# Patient Record
Sex: FEMALE | Race: WHITE | NOT HISPANIC OR LATINO
[De-identification: names, ages, dates, MRNs, and addresses within clinical notes are randomized per-mention and may not be internally consistent; named-entity substitution may affect disease eponyms.]

---

## 2018-01-12 ENCOUNTER — RECORD ABSTRACTING (OUTPATIENT)
Age: 58
End: 2018-01-12

## 2018-01-12 DIAGNOSIS — Z82.49 FAMILY HISTORY OF ISCHEMIC HEART DISEASE AND OTHER DISEASES OF THE CIRCULATORY SYSTEM: ICD-10-CM

## 2018-01-17 ENCOUNTER — APPOINTMENT (OUTPATIENT)
Dept: FAMILY MEDICINE | Facility: CLINIC | Age: 58
End: 2018-01-17
Payer: COMMERCIAL

## 2018-01-17 VITALS — SYSTOLIC BLOOD PRESSURE: 100 MMHG | DIASTOLIC BLOOD PRESSURE: 60 MMHG

## 2018-01-17 VITALS
HEIGHT: 65 IN | BODY MASS INDEX: 29.66 KG/M2 | DIASTOLIC BLOOD PRESSURE: 58 MMHG | TEMPERATURE: 97.8 F | WEIGHT: 178 LBS | HEART RATE: 88 BPM | RESPIRATION RATE: 16 BRPM | OXYGEN SATURATION: 97 % | SYSTOLIC BLOOD PRESSURE: 104 MMHG

## 2018-01-17 PROCEDURE — 86580 TB INTRADERMAL TEST: CPT

## 2018-01-17 PROCEDURE — 99213 OFFICE O/P EST LOW 20 MIN: CPT | Mod: 25

## 2018-01-19 ENCOUNTER — APPOINTMENT (OUTPATIENT)
Dept: FAMILY MEDICINE | Facility: CLINIC | Age: 58
End: 2018-01-19
Payer: COMMERCIAL

## 2018-01-19 PROCEDURE — ZZZZZ: CPT

## 2018-02-14 ENCOUNTER — NON-APPOINTMENT (OUTPATIENT)
Age: 58
End: 2018-02-14

## 2018-02-14 ENCOUNTER — APPOINTMENT (OUTPATIENT)
Dept: FAMILY MEDICINE | Facility: CLINIC | Age: 58
End: 2018-02-14
Payer: COMMERCIAL

## 2018-02-14 VITALS
DIASTOLIC BLOOD PRESSURE: 70 MMHG | RESPIRATION RATE: 14 BRPM | TEMPERATURE: 98 F | SYSTOLIC BLOOD PRESSURE: 128 MMHG | HEIGHT: 65 IN | HEART RATE: 60 BPM | BODY MASS INDEX: 29.99 KG/M2 | WEIGHT: 180 LBS | OXYGEN SATURATION: 98 %

## 2018-02-14 LAB
BILIRUB UR QL STRIP: NORMAL
GLUCOSE UR-MCNC: NORMAL
HCG UR QL: 0.2 EU/DL
HGB UR QL STRIP.AUTO: NORMAL
KETONES UR-MCNC: NORMAL
LEUKOCYTE ESTERASE UR QL STRIP: NORMAL
NITRITE UR QL STRIP: NORMAL
PH UR STRIP: 7
PROT UR STRIP-MCNC: NORMAL
SP GR UR STRIP: 1.02

## 2018-02-14 PROCEDURE — 99396 PREV VISIT EST AGE 40-64: CPT | Mod: 25

## 2018-02-14 PROCEDURE — 81003 URINALYSIS AUTO W/O SCOPE: CPT | Mod: QW

## 2018-02-14 PROCEDURE — 93000 ELECTROCARDIOGRAM COMPLETE: CPT

## 2018-02-14 RX ORDER — TELMISARTAN AND HYDROCHLOROTHIAZIDE 40; 12.5 MG/1; MG/1
40-12.5 TABLET ORAL
Refills: 0 | Status: DISCONTINUED | COMMUNITY
End: 2018-02-14

## 2018-02-16 LAB — BACTERIA UR CULT: NORMAL

## 2018-02-21 ENCOUNTER — APPOINTMENT (OUTPATIENT)
Dept: FAMILY MEDICINE | Facility: CLINIC | Age: 58
End: 2018-02-21
Payer: COMMERCIAL

## 2018-02-21 VITALS
SYSTOLIC BLOOD PRESSURE: 128 MMHG | HEART RATE: 84 BPM | OXYGEN SATURATION: 98 % | BODY MASS INDEX: 29.82 KG/M2 | WEIGHT: 179 LBS | RESPIRATION RATE: 14 BRPM | HEIGHT: 65 IN | DIASTOLIC BLOOD PRESSURE: 78 MMHG

## 2018-02-21 DIAGNOSIS — Z78.0 ASYMPTOMATIC MENOPAUSAL STATE: ICD-10-CM

## 2018-02-21 PROCEDURE — 36415 COLL VENOUS BLD VENIPUNCTURE: CPT

## 2018-02-22 LAB
BASOPHILS # BLD AUTO: 0.01 K/UL
BASOPHILS NFR BLD AUTO: 0.2 %
EOSINOPHIL # BLD AUTO: 0.07 K/UL
EOSINOPHIL NFR BLD AUTO: 1.4 %
HCT VFR BLD CALC: 40.2 %
HGB BLD-MCNC: 13 G/DL
IMM GRANULOCYTES NFR BLD AUTO: 0 %
LYMPHOCYTES # BLD AUTO: 1.52 K/UL
LYMPHOCYTES NFR BLD AUTO: 30.3 %
MAN DIFF?: NORMAL
MCHC RBC-ENTMCNC: 30.7 PG
MCHC RBC-ENTMCNC: 32.3 GM/DL
MCV RBC AUTO: 95 FL
MONOCYTES # BLD AUTO: 0.37 K/UL
MONOCYTES NFR BLD AUTO: 7.4 %
NEUTROPHILS # BLD AUTO: 3.04 K/UL
NEUTROPHILS NFR BLD AUTO: 60.7 %
PLATELET # BLD AUTO: 311 K/UL
RBC # BLD: 4.23 M/UL
RBC # FLD: 14.4 %
WBC # FLD AUTO: 5.01 K/UL

## 2018-02-25 LAB — CYTOLOGY CVX/VAG DOC THIN PREP: NORMAL

## 2018-03-07 ENCOUNTER — MEDICATION RENEWAL (OUTPATIENT)
Age: 58
End: 2018-03-07

## 2018-03-15 ENCOUNTER — MEDICATION RENEWAL (OUTPATIENT)
Age: 58
End: 2018-03-15

## 2018-06-18 ENCOUNTER — APPOINTMENT (OUTPATIENT)
Dept: FAMILY MEDICINE | Facility: CLINIC | Age: 58
End: 2018-06-18
Payer: COMMERCIAL

## 2018-06-18 VITALS
HEART RATE: 70 BPM | DIASTOLIC BLOOD PRESSURE: 72 MMHG | WEIGHT: 176 LBS | SYSTOLIC BLOOD PRESSURE: 118 MMHG | BODY MASS INDEX: 29.29 KG/M2 | OXYGEN SATURATION: 98 %

## 2018-06-18 PROCEDURE — 99213 OFFICE O/P EST LOW 20 MIN: CPT

## 2018-06-18 NOTE — HISTORY OF PRESENT ILLNESS
[FreeTextEntry8] : Pt is here c/o poss pink eye. Pt c/o itchiness, grittiness, redness, and sensitivity to light in both eyes for about 1 day. Pt is not sure if its allergies or pink eye.

## 2018-06-18 NOTE — ASSESSMENT
[FreeTextEntry1] : trial tobradex and optivar eye drops. no eye make use compresses and otc allegra for antihistamine relief\par We spent sufficient time to discuss aspects of care; questions were answered  to patient's satisfaction.The diagnosis and care plan were discussed with patient in detail.  Patient test results were  reviewed and explained in full. All questions were answered.\par

## 2018-06-18 NOTE — REVIEW OF SYSTEMS
[Discharge] : discharge [Vision Problems] : vision problems [Itching] : itching [Negative] : Respiratory

## 2018-06-18 NOTE — PHYSICAL EXAM
[Conjunctival Hyperemia Bilateral] : hyperemia [Conjunctival Watery Discharge Both Eyes] : a watery discharge [Conjunctival Purulent Discharge Both Eyes] : a purulent discharge [Epiphora Both Eyes] : increased tearing [Normal Rhythm/Effort] : normal respiratory rhythm and effort [Clear Bilaterally] : the lungs were clear to auscultation bilaterally [Normal to Percussion] : the lungs were normal to percussion [Normal] : palpation of the chest was normal [Normal Rate] : normal [Normal S1] : normal S1 [Normal S2] : normal S2 [No Murmur] : no murmurs heard [No Pitting Edema] : no pitting edema present [2+] : left 2+ [No Abnormalities] : the abdominal aorta was not enlarged and no bruit was heard [S3] : no S3 [S4] : no S4 [Right Carotid Bruit] : no bruit heard over the right carotid [Left Carotid Bruit] : no bruit heard over the left carotid [Right Femoral Bruit] : no bruit heard over the right femoral artery [Left Femoral Bruit] : no bruit heard over the left femoral artery

## 2019-04-25 ENCOUNTER — APPOINTMENT (OUTPATIENT)
Dept: FAMILY MEDICINE | Facility: CLINIC | Age: 59
End: 2019-04-25
Payer: COMMERCIAL

## 2019-04-25 VITALS
HEIGHT: 65 IN | WEIGHT: 181 LBS | SYSTOLIC BLOOD PRESSURE: 120 MMHG | BODY MASS INDEX: 30.16 KG/M2 | DIASTOLIC BLOOD PRESSURE: 80 MMHG | RESPIRATION RATE: 14 BRPM | OXYGEN SATURATION: 97 % | HEART RATE: 88 BPM

## 2019-04-25 DIAGNOSIS — Z11.1 ENCOUNTER FOR SCREENING FOR RESPIRATORY TUBERCULOSIS: ICD-10-CM

## 2019-04-25 DIAGNOSIS — Z87.898 PERSONAL HISTORY OF OTHER SPECIFIED CONDITIONS: ICD-10-CM

## 2019-04-25 PROCEDURE — 99214 OFFICE O/P EST MOD 30 MIN: CPT

## 2019-04-25 RX ORDER — NYSTATIN 100000 [USP'U]/G
100000 CREAM TOPICAL
Refills: 0 | Status: COMPLETED | COMMUNITY
End: 2019-04-25

## 2019-04-25 RX ORDER — TOBRAMYCIN AND DEXAMETHASONE 3; 1 MG/ML; MG/ML
0.3-0.1 SUSPENSION/ DROPS OPHTHALMIC EVERY 8 HOURS
Qty: 5 | Refills: 0 | Status: COMPLETED | COMMUNITY
Start: 2018-06-18 | End: 2019-04-25

## 2019-04-25 RX ORDER — AZELASTINE HYDROCHLORIDE 0.5 MG/ML
0.05 SOLUTION/ DROPS OPHTHALMIC
Qty: 1 | Refills: 0 | Status: COMPLETED | COMMUNITY
Start: 2018-06-18 | End: 2019-04-25

## 2019-04-25 RX ORDER — ESTRADIOL 0.1 MG/G
0.1 CREAM VAGINAL
Qty: 3 | Refills: 0 | Status: COMPLETED | COMMUNITY
End: 2019-04-25

## 2019-04-25 NOTE — PHYSICAL EXAM
[No Acute Distress] : no acute distress [Well Nourished] : well nourished [Well Developed] : well developed [Well-Appearing] : well-appearing [No Respiratory Distress] : no respiratory distress  [Clear to Auscultation] : lungs were clear to auscultation bilaterally [No Accessory Muscle Use] : no accessory muscle use [Normal Rate] : normal rate  [Regular Rhythm] : with a regular rhythm [Normal S1, S2] : normal S1 and S2 [No Murmur] : no murmur heard [Coordination Grossly Intact] : coordination grossly intact [Normal Gait] : normal gait [No Focal Deficits] : no focal deficits [Normal Affect] : the affect was normal [Alert and Oriented x3] : oriented to person, place, and time [Normal Insight/Judgement] : insight and judgment were intact

## 2019-04-25 NOTE — HISTORY OF PRESENT ILLNESS
[FreeTextEntry1] : Patient presents for med check\par  [de-identified] : Patient presents today for follow up for hypertension. Patient reports feeling well today, no acute complaints. States [place]

## 2019-04-25 NOTE — HEALTH RISK ASSESSMENT
[Patient reported colonoscopy was normal] : Patient reported colonoscopy was normal [ColonoscopyDate] : 01/14 [ColonoscopyComments] : 5 yr follow up as per pt

## 2019-04-25 NOTE — ASSESSMENT
[FreeTextEntry1] : Pt not fasting today, will RTO for blood work appt. \par Vitals and exam stable. \par BP well controlled today, continue current regimen. \par Pt plans to get follow up pap, mammo, and colonoscopy. \par RTO in 6 mos.

## 2019-04-26 ENCOUNTER — APPOINTMENT (OUTPATIENT)
Dept: FAMILY MEDICINE | Facility: CLINIC | Age: 59
End: 2019-04-26
Payer: COMMERCIAL

## 2019-04-26 DIAGNOSIS — Z13.21 ENCOUNTER FOR SCREENING FOR NUTRITIONAL DISORDER: ICD-10-CM

## 2019-04-26 PROCEDURE — 36415 COLL VENOUS BLD VENIPUNCTURE: CPT

## 2019-04-29 LAB
25(OH)D3 SERPL-MCNC: 22.6 NG/ML
ALBUMIN SERPL ELPH-MCNC: 4.6 G/DL
ALP BLD-CCNC: 99 U/L
ALT SERPL-CCNC: 17 U/L
ANION GAP SERPL CALC-SCNC: 10 MMOL/L
AST SERPL-CCNC: 18 U/L
BASOPHILS # BLD AUTO: 0.03 K/UL
BASOPHILS NFR BLD AUTO: 0.9 %
BILIRUB SERPL-MCNC: 0.5 MG/DL
BUN SERPL-MCNC: 14 MG/DL
CALCIUM SERPL-MCNC: 10 MG/DL
CHLORIDE SERPL-SCNC: 104 MMOL/L
CHOLEST SERPL-MCNC: 211 MG/DL
CHOLEST/HDLC SERPL: 3.2 RATIO
CO2 SERPL-SCNC: 27 MMOL/L
CREAT SERPL-MCNC: 0.8 MG/DL
EOSINOPHIL # BLD AUTO: 0.07 K/UL
EOSINOPHIL NFR BLD AUTO: 2.1 %
ESTIMATED AVERAGE GLUCOSE: 126 MG/DL
GLUCOSE SERPL-MCNC: 102 MG/DL
HBA1C MFR BLD HPLC: 6 %
HCT VFR BLD CALC: 41.6 %
HDLC SERPL-MCNC: 66 MG/DL
HGB BLD-MCNC: 13.3 G/DL
IMM GRANULOCYTES NFR BLD AUTO: 0 %
LDLC SERPL CALC-MCNC: 131 MG/DL
LYMPHOCYTES # BLD AUTO: 1.53 K/UL
LYMPHOCYTES NFR BLD AUTO: 45.4 %
MAN DIFF?: NORMAL
MCHC RBC-ENTMCNC: 31.4 PG
MCHC RBC-ENTMCNC: 32 GM/DL
MCV RBC AUTO: 98.1 FL
MONOCYTES # BLD AUTO: 0.31 K/UL
MONOCYTES NFR BLD AUTO: 9.2 %
NEUTROPHILS # BLD AUTO: 1.43 K/UL
NEUTROPHILS NFR BLD AUTO: 42.4 %
PLATELET # BLD AUTO: 296 K/UL
POTASSIUM SERPL-SCNC: 4.8 MMOL/L
PROT SERPL-MCNC: 7 G/DL
RBC # BLD: 4.24 M/UL
RBC # FLD: 13.4 %
SODIUM SERPL-SCNC: 141 MMOL/L
T4 SERPL-MCNC: 6.2 UG/DL
TRIGL SERPL-MCNC: 69 MG/DL
TSH SERPL-ACNC: 2.19 UIU/ML
WBC # FLD AUTO: 3.37 K/UL

## 2021-01-02 ENCOUNTER — TRANSCRIPTION ENCOUNTER (OUTPATIENT)
Age: 61
End: 2021-01-02

## 2021-01-04 ENCOUNTER — APPOINTMENT (OUTPATIENT)
Age: 61
End: 2021-01-04
Payer: MEDICAID

## 2021-01-04 VITALS
HEART RATE: 83 BPM | HEIGHT: 65 IN | BODY MASS INDEX: 30.82 KG/M2 | SYSTOLIC BLOOD PRESSURE: 136 MMHG | WEIGHT: 185 LBS | DIASTOLIC BLOOD PRESSURE: 96 MMHG

## 2021-01-04 DIAGNOSIS — I10 ESSENTIAL (PRIMARY) HYPERTENSION: ICD-10-CM

## 2021-01-04 PROCEDURE — 99203 OFFICE O/P NEW LOW 30 MIN: CPT

## 2021-01-04 PROCEDURE — 99072 ADDL SUPL MATRL&STAF TM PHE: CPT

## 2021-01-04 NOTE — HISTORY OF PRESENT ILLNESS
[Stable] : stable [___ days] : [unfilled] day(s) ago [2] : a current pain level of 2/10 [Lifting] : worsened by lifting [Rest] : relieved by rest [de-identified] : ZEYAD is a 60 year old female who presents today for initial evaluation of right shoulder greater tuberosity fracture 3 days ago.  Patient fell while ice skating and endorsed immediate pain.  She presented to a [facility] where x-rays obtained revealed a mildly displaced greater tuberosity fracture as well as calcific tendinitis.  She presents today without a sling and appears to be in little to no discomfort.  She admits to pain with arm abduction.  Her pain is sharp in nature.\par

## 2021-01-04 NOTE — DISCUSSION/SUMMARY
[de-identified] : Velia is a 60-year-old female sustained a fall to her right shoulder with a right greater tuberosity proximal humerus fracture.  It appears to be nondisplaced.  At this time I recommend sling immobilization.  I will see her back in 1 week for new x-rays.  If the x-rays demonstrate no increased displacement of the greater tuberosity will likely continue nonoperative care.  We may need sequential weekly x-rays.  If there is displacement we will discuss CT scan and possible surgical intervention.  He agrees with the above plan all questions were answered.

## 2021-01-04 NOTE — REASON FOR VISIT
[Initial Visit] : an initial visit for [FreeTextEntry2] : right shoulder pain, greater tuberosity fracture, DOI: 1/1/2021.

## 2021-01-04 NOTE — PHYSICAL EXAM
[de-identified] : Physical Exam:\par General: Well appearing, no acute distress, A&O\par Neurologic: A&Ox3, No focal deficits\par Head: NCAT without abrasions, lacerations, or ecchymosis to head, face, or scalp\par Eyes: No scleral icterus, no gross abnormalities\par Respiratory: Equal chest wall expansion bilaterally, no accessory muscle use\par Lymphatic: No lymphadenopathy palpated\par Skin: Warm and dry\par Psychiatric: Normal mood and affect\par \par Right Shoulder\par ·	Inspection/Palpation: Moderate swelling no ecchymosis right deltoid\par ·	Range of Motion: no crepitus with ROM; Active FF 10; ER at side 5; IR to belt; Passive  with mild pain; ER at side 25 with mild pain; IR to belt\par ·	Strength: forward elevation in scapular plane [4/5], internal rotation [4/5], external rotation [4/5], adduction [4/5] and abduction [4/5]\par ·	Stability: no joint instability on provocative testing\par ·	Tests: Negrete test positive, Neer positive, bear hug test positive, Napolean sign negative, cross arm adduction negative, lift off sign positive, hornblowers sign negative, speeds test negative, Yergason's test negative, no bicipital groove tenderness, Nielsen's Active Compression test negative, whipple test [negative/positive], bicep's load II test negative\par \par Left Shoulder\par ·	Inspection/Palpation: no tenderness, swelling or deformities\par ·	Range of Motion: full and painless in all planes, no crepitus\par ·	Strength: forward elevation in scapular plane 5/5, internal rotation 5/5, external rotation 5/5, adduction 5/5 and abduction 5/5\par ·	Stability: no joint instability on provocative testing\par Tests: Negrete test negative, Neer sign negative, negative drop arm test secondary to pain, bear hug test negative, Napolean sign negative, cross arm adduction negative, lift off sign positive, hornblowers sign negative, speeds test negative, Yergason's test negative, no bicipital groove tenderness, Nielsen's Active Compression test negative [de-identified] : 3 views of the right shoulder performed in outside facility but was able to review them.  They demonstrate a greater tuberosity proximal humerus fracture as well as a large calcium deposit in the infraspinatus

## 2021-01-07 ENCOUNTER — APPOINTMENT (OUTPATIENT)
Dept: FAMILY MEDICINE | Facility: CLINIC | Age: 61
End: 2021-01-07
Payer: MEDICAID

## 2021-01-07 ENCOUNTER — TRANSCRIPTION ENCOUNTER (OUTPATIENT)
Age: 61
End: 2021-01-07

## 2021-01-07 VITALS
HEART RATE: 89 BPM | BODY MASS INDEX: 30.82 KG/M2 | DIASTOLIC BLOOD PRESSURE: 78 MMHG | SYSTOLIC BLOOD PRESSURE: 160 MMHG | OXYGEN SATURATION: 98 % | WEIGHT: 185 LBS | RESPIRATION RATE: 14 BRPM | TEMPERATURE: 97.3 F | HEIGHT: 65 IN

## 2021-01-07 DIAGNOSIS — Z01.419 ENCOUNTER FOR GYNECOLOGICAL EXAMINATION (GENERAL) (ROUTINE) W/OUT ABNORMAL FINDINGS: ICD-10-CM

## 2021-01-07 PROCEDURE — 99072 ADDL SUPL MATRL&STAF TM PHE: CPT

## 2021-01-07 PROCEDURE — 99396 PREV VISIT EST AGE 40-64: CPT | Mod: 25

## 2021-01-07 RX ORDER — TELMISARTAN 20 MG/1
20 TABLET ORAL
Qty: 90 | Refills: 2 | Status: DISCONTINUED | COMMUNITY
Start: 2018-01-17 | End: 2021-01-07

## 2021-01-07 NOTE — HISTORY OF PRESENT ILLNESS
[___ Year(s) Ago] : [unfilled] year(s) ago [Good] : being in good health [Healthy Diet] : a healthy diet [Regular Exercise] : regular exercise [Last Mammogram ___] : Last Mammogram was [unfilled] [Last Bone Density ___] : Last bone density studies [unfilled] [Last Colonoscopy ___] : Last colonoscopy [unfilled] [Last Pap ___] : Last cervical pap smear was [unfilled] [Last Pap Smear ___] : last Papanicolaou cytology done [unfilled] [Annual Vaginal Sonography ___] : annual vaginal sonograph [unfilled] [BRCA1 ___] : BRCA1 gene [unfilled] [ ___] :  [unfilled] [Mammogram Last Year] : a mammogram last year [Colonoscopy Within 10 Years] : a colonoscopy within the past ten years [Performed Last Year] : glucose screening performed last year [Performed Within 5 Years] : DEXA performed within the past five years [Hypertension] : hypertension [Postmenopausal] : is postmenopausal [Pregnancy History] : pregnancy history: [Total Preg ___] : [unfilled] [Full Term ___] : [unfilled] [Living ___] : [unfilled] [Up to Date] : up to date with ~his/her~ immunizations [Lesion] : lesion [0/10] : is 0/10 in severity [Skin Color Change] : skin color change [V/V Cream] : improved by V/V cream [Currently In Menopause] : currently in menopause [Menopause Age: ____] : age at menopause was [unfilled] [Age of First Sexual [place] ___] : became sexually active at the age of [unfilled] [Male ___] : [unfilled] male [Vaginal Itching] : vaginal itching [Weight Concerns] : no concerns with her weight [Diabetes] : no diabetes [Stress] : no stress [Obesity] : no obesity [Tobacco Use] : no tobacco use [Illicit Drug Use] : no illicit drug use [Sedentary Lifestyle] : no sedentary lifestyle [FH Cardiovascular Disease] : no family history of cardiovascular disease [Previous Breast Cancer] : no previous breast cancer [Abnormal Cervical Cytology] : no abnormal cervical cytology [HPV Positive] : no positive screening for human papilloma virus [Hormone Therapy] : no hormone therapy [In Utero CLAUDINE Exposure] : no diethylstilbestrol (CLAUDINE) exposure in utero [Previous Colon Polyps] : no previous colon polyps [Inflammatory Bowel Disease] : no inflammatory bowel disease [Osteoporosis Risk Factors] : no osteoporosis risk factors [Asbestos Exposure] : no asbestos exposure [Radiation Exposure] : no radiation exposure [Occupational Exp. to ___] : no occupational exposure to [unfilled] [Vaginal Odor Foul] : vaginal odor not foul [Genital Wart] : no genital wart [Skin Growth] : no skin growth(s) [Skin Ulcerations] : no skin ulceration(s) [Cold Compress] : not improved with cold compress [Warm Compress] : not improved by warm compress [Oral Medications] : not improved by oral medications [Urination] : not worsened by urinating [Heat] : not worsened by heat [Sweat] : not worsened by sweating [Diabetes Mellitus] : no diabetes mellitus [Crohn's Disease] : no Crohn's disease [Autoimmune Disease] : no autoimmune disease [Thyrotoxicosis] : no thyrotoxicosis with or without goiter [Hx HSV] : no herpes simplex [HX HPV] : no human papilloma virus infection [FreeTextEntry9] : no [Hot Flashes] : no hot flashes [Night Sweats] : no night sweats [Dyspareunia] : no dyspareunia [Mood Changes] : no mood changes [Headache] : no headache [Fatigue] : no fatigue [Weight Change] : no weight change [Irritability] : no irritability [Forgetfulness] : no forgetfulness [Loss of Libido] : no loss of libido [Depression] : no depression [Anxiety] : no anxiety [FreeTextEntry8] : no [Experiencing Menopausal Sxs] : not experiencing menopausal symptoms [Sexually Active] : is not sexually active [Contraception] : does not use contraception

## 2021-01-07 NOTE — DISCUSSION/SUMMARY
[FreeTextEntry1] : Basic cardiovascular prevention measures are advised including regular exercise, surveillance medical examination, and prudent portion-controlled low fat diet, rich in a variety of vegetables with minimal added sugars, refined starches, and no artificially hydrogenated oils.\par Discussion and interpretation of previous tests , external notes( labs, radiology, specialist  , patient verbalized understanding.\par Labs drawn/ specimens obtained  in office on this date of service  for evaluation of   assessed conditions pap   to be run at [facility].\par mammogram\par breast us\par pelvic sono\par refer to gyn for vulvar evaluation biopsy treatment\par

## 2021-01-07 NOTE — END OF VISIT
[>50% of Time Spent on Counseling and Coordination of Care for  ___] : Greater than 50% of the encounter time was spent on counseling and coordination of care for [unfilled] [Time Spent: ___ minutes] : I have spent [unfilled] minutes of face to face time with the patient [>50% of the face to face encounter time was spent on counseling and/or coordination of care for ___] : Greater than 50% of the face to face encounter time was spent on counseling and/or coordination of care for [unfilled]

## 2021-01-07 NOTE — CHIEF COMPLAINT
[Annual Visit] : annual visit [FreeTextEntry1] : pt moved [facility]  to [place] with my mom\par [place]  [place] and kayak and the dog is with somewhere else.\par We moved back 2 months ago bc the opportunities were not there for Shaun and employment. shahnaz w Covid.  My mom is 92 is well and [place]\par I got a kidney stone in May 2019\par I became a grandmother. I went to [place] and saw my son who got  and also had a grandchild.\par I was followed by a specialist in [place] after the kidney stone.\par My bp was high. 130/80\par At home 113/70  but sometimes 145/   It goes up and down. I am on telmisartan 40 mg. \par I have had yeast infections. I do have a spot that is itchy and not getting. ...it is raised. \par \par i also fracture my right shoulder  on [place] ice skating

## 2021-01-07 NOTE — COUNSELING
[Breast Self Exam] : breast self exam [Nutrition] : nutrition [Exercise] : exercise [Vitamins/Supplements] : vitamins/supplements [Sunscreen] : sunscreen [Vulvar Hygiene] : vulvar hygiene

## 2021-01-07 NOTE — PHYSICAL EXAM
[Awake] : awake [Alert] : alert [Acute Distress] : acute distress [Vulvar Atrophy] : vulvar atrophy [Vulvar Mass ___ cm] : a [unfilled] ~Ucm vulvar mass [Labia Majora Erythema Right] : erythema of the right labia majora [Labia Majora Erythema Left] : erythema of the left labia majora [Normal] : uterus [de-identified] : right vulvar raised irregular lesion  [de-identified] : right vulvar raised irregular lesion

## 2021-01-11 ENCOUNTER — APPOINTMENT (OUTPATIENT)
Dept: ORTHOPEDIC SURGERY | Facility: CLINIC | Age: 61
End: 2021-01-11
Payer: MEDICAID

## 2021-01-11 PROCEDURE — 99214 OFFICE O/P EST MOD 30 MIN: CPT

## 2021-01-11 PROCEDURE — 99072 ADDL SUPL MATRL&STAF TM PHE: CPT

## 2021-01-11 PROCEDURE — 73030 X-RAY EXAM OF SHOULDER: CPT | Mod: RT

## 2021-01-12 ENCOUNTER — APPOINTMENT (OUTPATIENT)
Dept: OBGYN | Facility: CLINIC | Age: 61
End: 2021-01-12
Payer: MEDICAID

## 2021-01-12 VITALS
DIASTOLIC BLOOD PRESSURE: 80 MMHG | TEMPERATURE: 97.8 F | SYSTOLIC BLOOD PRESSURE: 140 MMHG | WEIGHT: 190 LBS | HEIGHT: 65 IN | BODY MASS INDEX: 31.65 KG/M2

## 2021-01-12 DIAGNOSIS — Z83.518 FAMILY HISTORY OF OTHER SPECIFIED EYE DISORDER: ICD-10-CM

## 2021-01-12 PROCEDURE — 99203 OFFICE O/P NEW LOW 30 MIN: CPT | Mod: 25

## 2021-01-12 PROCEDURE — 99072 ADDL SUPL MATRL&STAF TM PHE: CPT

## 2021-01-12 PROCEDURE — 56605 BIOPSY OF VULVA/PERINEUM: CPT

## 2021-01-12 NOTE — PROCEDURE
[Vulvar Biopsy] : Vulvar Biopsy [Time out performed] : Pre-procedure time out performed.  Patient's name, date of birth and procedure confirmed. [Consent Obtained] : Consent obtained [] : on the right labia majora [Local Anesthesia] : local anesthesia [____ Lidocaine w/o Epi] : ~VmL lidocaine without epinephrine [Betadine] : Betadine [Sent to Pathology] : placed in buffered formalin and sent for pathology [Punch] : punch biopsy [Silver Nitrate] : silver nitrate [Pressure] : the application of direct pressure [Tolerated Well] : the patient tolerated the procedure well [No Complications] : there were no complications [de-identified] : right vulva/labia 10oclock [de-identified] : 5mm

## 2021-01-12 NOTE — LETTER GREETING
[FreeTextEntry1] : Dear Alcira Villeda, \par I had the pleasure of evaluating your patient, ZEYAD DICKINSON. Please see my summary of recommendations followed by my full consultation note. \par \par Thank you for allowing me to participate in the care of this patient. If you have any questions, please do not hesitate to contact me.\par \par Sincerely,\par \par Liv Jimenez MD, FACOG \par [facility]\par Obstetrics and Gynecology in [place]\par [address] [place] [address], [address]\[place] [address]\[facility] Phone: [phone] Fax: [phone]

## 2021-01-12 NOTE — PHYSICAL EXAM
[Alert] : alert [Appropriately responsive] : appropriately responsive [No Acute Distress] : no acute distress [No Lymphadenopathy] : no lymphadenopathy [Soft] : soft [Non-tender] : non-tender [Non-distended] : non-distended [No HSM] : No HSM [No Mass] : no mass [No Lesions] : no lesions [Oriented x3] : oriented x3 [Vulvar Atrophy] : vulvar atrophy [Labia Majora] : normal [Labia Minora] : normal [Normal] : normal [Tenderness] : nontender [Enlarged ___ wks] : not enlarged [Uterine Adnexae] : normal [FreeTextEntry1] : labial agglutination noted anteriorly approximately 1cm, hypopigmentation noted posterior vulva/perineum

## 2021-01-12 NOTE — HISTORY OF PRESENT ILLNESS
[Patient reported mammogram was normal] : Patient reported mammogram was normal [Patient reported colonoscopy was normal] : Patient reported colonoscopy was normal [postmenopausal] : postmenopausal [N] : Patient is not sexually active [Y] : Positive pregnancy history [Mammogramdate] : 2019 [TextBox_19] : pt has referral for appt [PapSmeardate] : 1/7/21 [TextBox_31] : pending [ColonoscopyDate] : 2019 [PGHxTotal] : 3 [[facility]] : 3 [[facility]] : 3 [FreeTextEntry1] : NSVDx3. Denies cysts, fibroids, abnormal pap, STI

## 2021-01-12 NOTE — PLAN
[FreeTextEntry1] : 61 yo with new vulvar lesion, now s/p vulvar biopsy. \par -Vulvar biopsy to pathology\par -Biopsy care reviewed\par -Return in 2 weeks for follow-up\par -Pap results pending

## 2021-01-13 LAB — CYTOLOGY CVX/VAG DOC THIN PREP: ABNORMAL

## 2021-01-13 NOTE — HISTORY OF PRESENT ILLNESS
[Stable] : stable [___ days] : [unfilled] day(s) ago [2] : a current pain level of 2/10 [Lifting] : worsened by lifting [Rest] : relieved by rest [de-identified] : ZEYAD is a 60 year old female who presents today for F/U evaluation of right shoulder greater tuberosity fracture 1/1/2021.  Patient fell while ice skating and endorsed immediate pain.  She presented to a [facility] where x-rays obtained revealed a mildly displaced greater tuberosity fracture as well as calcific tendinitis.  She presents today with a sling and appears to be in little to no discomfort.  She admits to pain with arm abduction.  Her pain is sharp in nature.\par

## 2021-01-13 NOTE — REASON FOR VISIT
[Follow-Up Visit] : a follow-up visit for [FreeTextEntry2] : right shoulder pain, greater tuberosity fx.; DOI: 1/1/2021.

## 2021-01-13 NOTE — PHYSICAL EXAM
[de-identified] : Physical Exam:\par General: Well appearing, no acute distress, A&O\par Neurologic: A&Ox3, No focal deficits\par Head: NCAT without abrasions, lacerations, or ecchymosis to head, face, or scalp\par Eyes: No scleral icterus, no gross abnormalities\par Respiratory: Equal chest wall expansion bilaterally, no accessory muscle use\par Lymphatic: No lymphadenopathy palpated\par Skin: Warm and dry\par Psychiatric: Normal mood and affect\par \par Right Shoulder\par ·	Inspection/Palpation: Moderate swelling no ecchymosis right deltoid\par ·	Range of Motion: no crepitus with ROM; Active FF 10; ER at side 5; IR to belt; Passive  with mild pain; ER at side 25 with mild pain; IR to belt\par ·	Strength: forward elevation in scapular plane [4/5], internal rotation [4/5], external rotation [4/5], adduction [4/5] and abduction [4/5]\par ·	Stability: no joint instability on provocative testing\par ·	Tests: Negrete test positive, Neer positive, bear hug test positive, Napolean sign negative, cross arm adduction negative, lift off sign positive, hornblowers sign negative, speeds test negative, Yergason's test negative, no bicipital groove tenderness, Nielsen's Active Compression test negative, whipple test [negative/positive], bicep's load II test negative\par \par Left Shoulder\par ·	Inspection/Palpation: no tenderness, swelling or deformities\par ·	Range of Motion: full and painless in all planes, no crepitus\par ·	Strength: forward elevation in scapular plane 5/5, internal rotation 5/5, external rotation 5/5, adduction 5/5 and abduction 5/5\par ·	Stability: no joint instability on provocative testing\par Tests: Negrete test negative, Neer sign negative, negative drop arm test secondary to pain, bear hug test negative, Napolean sign negative, cross arm adduction negative, lift off sign positive, hornblowers sign negative, speeds test negative, Yergason's test negative, no bicipital groove tenderness, Nielsen's Active Compression test negative [de-identified] : 2 views of the right shoulder performed today were reviewed with the patient.  They demonstrate a greater tuberosity proximal humerus fracture as well as a large calcium deposit in the infraspinatus

## 2021-01-13 NOTE — DISCUSSION/SUMMARY
[de-identified] : Velia is a 60-year-old female sustained a fall to her right shoulder with a right greater tuberosity proximal humerus fracture.  It appears to be nondisplaced.  At this time I recommend continuation with sling immobilization. Her xrays today demonstrate no increased displacement of the greater tuberosity fracture. Due to this she will likely do well with nonoperative care.  I will see her back in 1 week for new x-rays. If at any point there is displacement further I will recommend a CT scan and surgical intervention. The importance of continuation within her sling and nonweightbearing to this arm was stressed again today for proper healing. She agrees with the above plan all questions were answered.

## 2021-01-22 LAB — CORE LAB BIOPSY: NORMAL

## 2021-01-25 ENCOUNTER — NON-APPOINTMENT (OUTPATIENT)
Age: 61
End: 2021-01-25

## 2021-01-25 ENCOUNTER — APPOINTMENT (OUTPATIENT)
Dept: FAMILY MEDICINE | Facility: CLINIC | Age: 61
End: 2021-01-25
Payer: MEDICAID

## 2021-01-25 VITALS
TEMPERATURE: 97.7 F | SYSTOLIC BLOOD PRESSURE: 150 MMHG | HEART RATE: 67 BPM | WEIGHT: 185 LBS | BODY MASS INDEX: 30.82 KG/M2 | OXYGEN SATURATION: 97 % | RESPIRATION RATE: 14 BRPM | HEIGHT: 65 IN | DIASTOLIC BLOOD PRESSURE: 90 MMHG

## 2021-01-25 PROCEDURE — 99072 ADDL SUPL MATRL&STAF TM PHE: CPT

## 2021-01-25 PROCEDURE — 93000 ELECTROCARDIOGRAM COMPLETE: CPT

## 2021-01-25 PROCEDURE — 99213 OFFICE O/P EST LOW 20 MIN: CPT | Mod: 25

## 2021-01-25 NOTE — ASSESSMENT
[FreeTextEntry1] : change telmisartan add hctz \par check bp q am\par obtain results from last physical chem lipid...\par Basic cardiovascular prevention measures are advised including regular exercise, surveillance medical examination, and prudent portion-controlled low fat diet, rich in a variety of vegetables with minimal added sugars, refined starches, and no artificially hydrogenated oils.\par Discussion and interpretation of previous tests , external notes( labs, radiology, specialist  , patient verbalized understanding.\par Prescription drug management- telmisartan hctz follow up 3 m\par EKG performed on this day in the office and reviewed by THERESE Santiago. It is stable.\par We spent sufficient time to discuss aspects of care; questions were answered  to patient's satisfaction.The diagnosis and care plan were discussed with patient in detail.  Patient test results were  reviewed and explained in full. All questions and concerns  were answered to the best of my knowledge.\par

## 2021-01-25 NOTE — HISTORY OF PRESENT ILLNESS
[FreeTextEntry1] : patient presents for blood pressure check\par  [de-identified] : 59 yo wf here for follow up htn\par Her vaginal biopsy was verruca . she was very relieved\par her shoulder is getting better she is not wearing her brace today\par Her bp is higher than normal\par Otherwise patient reports feeling well.  Patient specifically denies chest pain, shortness of breath, dyspnea on exertion, edema, PND, orthopnea, dizziness, or syncope. Patient reports compliance with medications. Patient denies fever, chills, night sweats, nausea, vomiting , no pain, erythema, swollen joints, hematuria, hematochezia , hematemesis, or melena.\par \par 135/85 at home \par

## 2021-01-27 ENCOUNTER — APPOINTMENT (OUTPATIENT)
Dept: OBGYN | Facility: CLINIC | Age: 61
End: 2021-01-27
Payer: MEDICAID

## 2021-01-27 VITALS
BODY MASS INDEX: 30.86 KG/M2 | TEMPERATURE: 97.6 F | HEIGHT: 66 IN | SYSTOLIC BLOOD PRESSURE: 148 MMHG | WEIGHT: 192 LBS | DIASTOLIC BLOOD PRESSURE: 72 MMHG

## 2021-01-27 PROCEDURE — 99213 OFFICE O/P EST LOW 20 MIN: CPT

## 2021-01-27 PROCEDURE — 99072 ADDL SUPL MATRL&STAF TM PHE: CPT

## 2021-01-27 NOTE — DISCUSSION/SUMMARY
[FreeTextEntry1] : 59 yo with lichen sclerosus and solitary right vulvar lesion increasing in size, biopsy benign. \par -Given concern for underlying malignancy despite negative pathology, I recommend evaluation by GYN Oncology. Referral information given\par -We discussed recommendation for treatment of lichen sclerosus with topical corticosteroids, to be further discussed after Oncology visit\par -All questions were answered to her satisfaction

## 2021-01-27 NOTE — HISTORY OF PRESENT ILLNESS
[FreeTextEntry1] : 61 yo here for results of vulvar biopsy. States had some minimal bleeding the evening of the procedure which resolved. c/o itching around the lesion. \par \par Right vulvar biopsy: verrucous squamous lesion with hyalinization of papillary dermis, compatible with wart associated with hypertrophic lichen sclerosus. \par \par I spoke to the pathologist who states there was no evidence of carcinoma. \par \par Findings reviewed with pt. In regards to lichen sclerosus, denies any hx of this diagnosis in the past. States hx of frequent yeast infections and vaginal itching years ago that resolved 3-4 yrs ago. No further itching until more recently with this lesion occurred.

## 2021-01-27 NOTE — PHYSICAL EXAM
[Appropriately responsive] : appropriately responsive [Alert] : alert [No Acute Distress] : no acute distress [Oriented x3] : oriented x3 [Vulvar Atrophy] : vulvar atrophy [Labia Majora] : normal [Labia Minora] : normal [Normal] : normal [Uterine Adnexae] : normal [Tenderness] : nontender [Enlarged ___ wks] : not enlarged [FreeTextEntry1] : labial agglutination noted anteriorly approximately 1cm, hypopigmentation noted posterior vulva/perineum

## 2021-01-28 ENCOUNTER — APPOINTMENT (OUTPATIENT)
Dept: ORTHOPEDIC SURGERY | Facility: CLINIC | Age: 61
End: 2021-01-28
Payer: MEDICAID

## 2021-01-28 PROCEDURE — 99214 OFFICE O/P EST MOD 30 MIN: CPT

## 2021-01-28 PROCEDURE — 99072 ADDL SUPL MATRL&STAF TM PHE: CPT

## 2021-01-28 PROCEDURE — 73030 X-RAY EXAM OF SHOULDER: CPT | Mod: RT

## 2021-01-28 NOTE — PHYSICAL EXAM
[de-identified] : Physical Exam:\par General: Well appearing, no acute distress, A&O\par Neurologic: A&Ox3, No focal deficits\par Head: NCAT without abrasions, lacerations, or ecchymosis to head, face, or scalp\par Eyes: No scleral icterus, no gross abnormalities\par Respiratory: Equal chest wall expansion bilaterally, no accessory muscle use\par Lymphatic: No lymphadenopathy palpated\par Skin: Warm and dry\par Psychiatric: Normal mood and affect\par \par Right Shoulder\par ·	Inspection/Palpation: Moderate swelling no ecchymosis right deltoid\par ·	Range of Motion: no crepitus with ROM; Active FF 10; ER at side 5; IR to belt; Passive  with mild pain; ER at side 25 with mild pain; IR to belt\par ·	Strength: forward elevation in scapular plane [4/5], internal rotation [4/5], external rotation [4/5], adduction [4/5] and abduction [4/5]\par ·	Stability: no joint instability on provocative testing\par ·	Tests: Negrete test positive, Neer positive, bear hug test positive, Napolean sign negative, cross arm adduction negative, lift off sign positive, hornblowers sign negative, speeds test negative, Yergason's test negative, no bicipital groove tenderness, Nielsen's Active Compression test negative, whipple test [negative/positive], bicep's load II test negative\par \par Left Shoulder\par ·	Inspection/Palpation: no tenderness, swelling or deformities\par ·	Range of Motion: full and painless in all planes, no crepitus\par ·	Strength: forward elevation in scapular plane 5/5, internal rotation 5/5, external rotation 5/5, adduction 5/5 and abduction 5/5\par ·	Stability: no joint instability on provocative testing\par Tests: Negrete test negative, Neer sign negative, negative drop arm test secondary to pain, bear hug test negative, Napolean sign negative, cross arm adduction negative, lift off sign positive, hornblowers sign negative, speeds test negative, Yergason's test negative, no bicipital groove tenderness, Nielsen's Active Compression test negative [de-identified] : 4 views of the right shoulder performed today were reviewed with the patient.  They demonstrate a greater tuberosity proximal humerus fracture with healing noted as well as a large calcium deposit in the infraspinatus.

## 2021-01-28 NOTE — HISTORY OF PRESENT ILLNESS
[Stable] : stable [___ days] : [unfilled] day(s) ago [2] : a current pain level of 2/10 [Lifting] : worsened by lifting [Rest] : relieved by rest [de-identified] : ZEYAD is a 60 year old female who presents today for F/U evaluation of right shoulder greater tuberosity fracture 1/1/2021.  Patient fell while ice skating and endorsed immediate pain.  She presented to a [facility] where x-rays obtained revealed a mildly displaced greater tuberosity fracture as well as calcific tendinitis.  She presents today with a sling and appears to be in little to no discomfort. She reports improved pain and ROM. she denies use of OTC pain medications and numbness and tingling to the extremities.

## 2021-01-28 NOTE — DISCUSSION/SUMMARY
[de-identified] : Velia is a 60-year-old female sustained a fall to her right shoulder with a right greater tuberosity proximal humerus fracture.  It appears to be nondisplaced.  At this time I recommend continuation with sling immobilization. Her xrays today demonstrate no increased displacement of the greater tuberosity fracture with healing noted. Due to this she will likely do well with nonoperative care.  I will see her back in 1 week for new x-rays. If at any point there is displacement further I will recommend a CT scan and surgical intervention. The importance of continuation within her sling and nonweightbearing to this arm was stressed again today for proper healing. She agrees with the above plan all questions were answered.

## 2021-01-28 NOTE — REASON FOR VISIT
[Follow-Up Visit] : a follow-up visit for [FreeTextEntry2] : R shoulder pain, greater tuberosity f/x.

## 2021-02-01 PROBLEM — Z78.9 DENIES ALCOHOL CONSUMPTION: Status: ACTIVE | Noted: 2021-01-12

## 2021-02-01 PROBLEM — Z80.41 FAMILY HISTORY OF MALIGNANT NEOPLASM OF OVARY: Status: ACTIVE | Noted: 2018-01-17

## 2021-02-01 PROBLEM — Z78.9 SOCIAL ALCOHOL USE: Status: ACTIVE | Noted: 2021-02-01

## 2021-02-01 RX ORDER — TELMISARTAN 40 MG/1
40 TABLET ORAL
Refills: 0 | Status: DISCONTINUED | COMMUNITY
End: 2021-02-01

## 2021-02-03 ENCOUNTER — APPOINTMENT (OUTPATIENT)
Dept: GYNECOLOGIC ONCOLOGY | Facility: CLINIC | Age: 61
End: 2021-02-03
Payer: MEDICAID

## 2021-02-03 DIAGNOSIS — Z80.41 FAMILY HISTORY OF MALIGNANT NEOPLASM OF OVARY: ICD-10-CM

## 2021-02-03 DIAGNOSIS — Z78.9 OTHER SPECIFIED HEALTH STATUS: ICD-10-CM

## 2021-02-03 PROCEDURE — 99072 ADDL SUPL MATRL&STAF TM PHE: CPT

## 2021-02-03 PROCEDURE — 56821 COLPOSCOPY VULVA W/BIOPSY: CPT | Mod: 59

## 2021-02-03 PROCEDURE — 99204 OFFICE O/P NEW MOD 45 MIN: CPT | Mod: 25

## 2021-02-03 NOTE — PHYSICAL EXAM
[Abnormal] : Examination of vagina: Abnormal [Absent] : Adnexa(ae): Absent [Normal] : Bimanual Exam: Normal [de-identified] : Fusion of labia minora c/w LS. raised 1 cm lesion at 10 oclock on right labia minora,concerning for invasion. erythema of b/l vaginal introitus.  [de-identified] : Patient was interviewed and examined with chaperone present. Name of Chaperone: Kenna Nascimento MD

## 2021-02-03 NOTE — CHIEF COMPLAINT
[FreeTextEntry1] : [facility]\par \par [facility] [phone] at [address] [place], [place] [id number]

## 2021-02-03 NOTE — HISTORY OF PRESENT ILLNESS
[FreeTextEntry1] : 61 y/o  via  female, LMP 12 years ago being referred by Dr. Jimenez for abnormal vulvar biopsy. She reports to a history of a wart like lesion since 2020, with associated vulvar itching. Patient was seen by primary GYN and underwent right vulvar biopsy which revealed verrucous squamous lesion with hyalinization of papillary dermis, compatible with wart associated with hypertrophic lichen sclerous. Upon examination after biopsy it was noted that the lesion was described as papillary-appearing solitary raised white lesion on right vulva on labia majora, nontender and appears larger than last visit, measuring 2x1cm. Denies vaginal bleeding/discharge, abdominal pain/bloating/distension, pelvis discomfort, changes in normal bowel/urinary habits, nausea/vomiting, unintentional weight loss/gain, and all other associated signs and symptoms. She is not currently sexually active.\par \par Health Maintenance:\par LPap: 2021; normal\par LMammogram: 2019; normal\par LColonoscopy: 2019; normal

## 2021-02-03 NOTE — PROCEDURE
[Vulvar Biopsy] : a vulvar biopsy [Other: ___] : [unfilled] [None] : none [Other ___] :  [unfilled] [Silver Nitrate] : silver nitrate [Colposcopy] : colposcopy [Patient] : the patient [Verbal Consent] : verbal consent was obtained prior to the procedure and is detailed in the patient's record [No Abnormalities] : no abnormalities seen [Lesion] : a lesion was seen [Biopsy] : a biopsy was taken of the lesion [FreeTextEntry9] : Vulvar Lesion [FreeTextEntry1] : A US was performed in the office today. Please see report for findings.

## 2021-02-03 NOTE — END OF VISIT
[FreeTextEntry3] : This note accurately reflects the work and decisions made by me.\par Written by Erin Negron PA-C acting as a scribe for Dr. Susy Zambrano.

## 2021-02-09 ENCOUNTER — APPOINTMENT (OUTPATIENT)
Dept: GYNECOLOGIC ONCOLOGY | Facility: CLINIC | Age: 61
End: 2021-02-09

## 2021-02-16 ENCOUNTER — APPOINTMENT (OUTPATIENT)
Dept: GYNECOLOGIC ONCOLOGY | Facility: CLINIC | Age: 61
End: 2021-02-16
Payer: MEDICAID

## 2021-02-16 VITALS
BODY MASS INDEX: 30.86 KG/M2 | HEIGHT: 66 IN | WEIGHT: 192 LBS | DIASTOLIC BLOOD PRESSURE: 87 MMHG | SYSTOLIC BLOOD PRESSURE: 167 MMHG | RESPIRATION RATE: 16 BRPM | HEART RATE: 80 BPM

## 2021-02-16 PROCEDURE — 99072 ADDL SUPL MATRL&STAF TM PHE: CPT

## 2021-02-16 PROCEDURE — 99214 OFFICE O/P EST MOD 30 MIN: CPT

## 2021-02-16 NOTE — HISTORY OF PRESENT ILLNESS
[FreeTextEntry1] : Pt is a 61 yo with a 1 cm vulvar lesion biopsied in office. She also has lichen sclerosis. She presents for follow up. She has no new concerns.

## 2021-02-16 NOTE — END OF VISIT
[FreeTextEntry3] : Discharge for care with Dr. Mackay for Condyloma acuminatum \par  [Time Spent: ___ minutes] : I have spent [unfilled] minutes of time on the encounter.

## 2021-02-16 NOTE — ASSESSMENT
[FreeTextEntry1] : Pt is a 59 yo with condyloma acuminatum on right vulva ~ 1 cm in size and lichen sclerosis. Discussed there is no evidence of atypia. I will send her back to Dr. Mackay for wide local excision. I would recommend continued surveillance for cervical dysplasia/vulvar dysplasia and use of clobetasol for lichen sclerosus. All questions answered.

## 2021-02-22 LAB — CORE LAB BIOPSY: NORMAL

## 2021-03-03 ENCOUNTER — APPOINTMENT (OUTPATIENT)
Dept: FAMILY MEDICINE | Facility: CLINIC | Age: 61
End: 2021-03-03
Payer: MEDICAID

## 2021-03-03 ENCOUNTER — APPOINTMENT (OUTPATIENT)
Dept: OBGYN | Facility: CLINIC | Age: 61
End: 2021-03-03
Payer: MEDICAID

## 2021-03-03 VITALS
RESPIRATION RATE: 16 BRPM | SYSTOLIC BLOOD PRESSURE: 130 MMHG | HEART RATE: 96 BPM | HEIGHT: 66 IN | OXYGEN SATURATION: 98 % | TEMPERATURE: 97.6 F | DIASTOLIC BLOOD PRESSURE: 68 MMHG | BODY MASS INDEX: 30.53 KG/M2 | WEIGHT: 190 LBS

## 2021-03-03 VITALS
WEIGHT: 189.25 LBS | BODY MASS INDEX: 31.53 KG/M2 | DIASTOLIC BLOOD PRESSURE: 74 MMHG | HEIGHT: 65 IN | SYSTOLIC BLOOD PRESSURE: 138 MMHG | TEMPERATURE: 98.2 F

## 2021-03-03 DIAGNOSIS — A63.0 ANOGENITAL (VENEREAL) WARTS: ICD-10-CM

## 2021-03-03 PROCEDURE — 99072 ADDL SUPL MATRL&STAF TM PHE: CPT

## 2021-03-03 PROCEDURE — 99214 OFFICE O/P EST MOD 30 MIN: CPT

## 2021-03-03 NOTE — PLAN
[FreeTextEntry1] : 61 yo with large vulvar lesion consistent with condyloma acuminatum scheduled for wide local excision.\par -Surgery 3/8\par -Pain management discussed\par -All questions answered to her satisfaction

## 2021-03-03 NOTE — RESULTS/DATA
[ECG Reviewed] : reviewed [Normal] : The 12 - lead ECG is normal [NSR] : normal sinus rhythm [Ventricular Rate___] : ventricular rate is [unfilled] beats per minute [P Waves Normal] : the P wave is normal [Incomplete RBBB] : incomplete right bundle branch block [Normal ST Segments] : the ST segments are normal [] : results reviewed [de-identified] : known hx of right arm fracture

## 2021-03-03 NOTE — PHYSICAL EXAM
[Normal] : normal rate, regular rhythm, normal S1 and S2 and no murmur heard [de-identified] : limited rom right shoulder

## 2021-03-03 NOTE — HISTORY OF PRESENT ILLNESS
[No Pertinent Cardiac History] : no history of aortic stenosis, atrial fibrillation, coronary artery disease, recent myocardial infarction, or implantable device/pacemaker [No Pertinent Pulmonary History] : no history of asthma, COPD, sleep apnea, or smoking [No Adverse Anesthesia Reaction] : no adverse anesthesia reaction in self or family member [(Patient denies any chest pain, claudication, dyspnea on exertion, orthopnea, palpitations or syncope)] : Patient denies any chest pain, claudication, dyspnea on exertion, orthopnea, palpitations or syncope [Chronic Anticoagulation] : no chronic anticoagulation [Chronic Kidney Disease] : no chronic kidney disease [Diabetes] : no diabetes [FreeTextEntry1] : removal of vulvar lesion  [FreeTextEntry2] : 3/8/21 [FreeTextEntry3] : Dr.Melissa Chopra [FreeTextEntry4] : Patient presents for medical clearance. vulvar lesion removal

## 2021-03-03 NOTE — ASSESSMENT
[High Risk Surgery - Intraperitoneal, Intrathoracic or Supringuinal Vascular Procedures] : High Risk Surgery - Intraperitoneal, Intrathoracic or Supringuinal Vascular Procedures - No (0) [Ischemic Heart Disease] : Ischemic Heart Disease - No (0) [Congestive Heart Failure] : Congestive Heart Failure - No (0) [Prior Cerebrovascular Accident or TIA] : Prior Cerebrovascular Accident or TIA - No (0) [Creatinine >= 2mg/dL (1 Point)] : Creatinine >= 2mg/dL - No (0) [Insulin-dependent Diabetic (1 Point)] : Insulin-dependent Diabetic - No (0) [0] : 0 , RCRI Class: I, Risk of Post-Op Cardiac Complications: 3.9%, 95% CI for Risk Estimate: 2.8% - 5.4% [Patient Optimized for Surgery] : Patient optimized for surgery [No Further Testing Recommended] : no further testing recommended [Continue medications as is] : Continue current medications [As per surgery] : as per surgery [FreeTextEntry4] : patient cleared

## 2021-03-03 NOTE — CONSULT LETTER
[Dear  ___] : Dear  [unfilled], [Consult Closing:] : Thank you very much for allowing me to participate in the care of this patient.  If you have any questions, please do not hesitate to contact me. [FreeTextEntry1] : Patient cleared

## 2021-03-03 NOTE — HISTORY OF PRESENT ILLNESS
[FreeTextEntry1] : 59 yo here for preop visit. Pt with vulvar lesion with biopsy x 2, most recent pathology condyloma acuminatum. Surgical plan for wide local excision. Risks of procedure discussed, including bleeding, infection, damage to vagina or nearby organs, vessels, nerves, causing temporary or permanent injury. Discussed location of the incision which was shown on the vagina. Discussed recovery time of 1-2 weeks maximum and reviewed postoperative restrictions of nothing per vagina until incision has healed, no baths, no heavy exercise until postoperative visit. Pain management reviewed with plan for Tylenol or Motrin prn. \par \par Presurgical testing reviewed, EKG abnormal, s/p medical clearance by PCP. Covid test this weekend. \par \par All questions answered to her satisfaction. Discussed we will start treatment for lichen sclerosus after she heals from her procedure.

## 2021-03-08 ENCOUNTER — RESULT REVIEW (OUTPATIENT)
Age: 61
End: 2021-03-08

## 2021-03-08 ENCOUNTER — APPOINTMENT (OUTPATIENT)
Dept: OBGYN | Facility: HOSPITAL | Age: 61
End: 2021-03-08
Payer: MEDICAID

## 2021-03-08 PROCEDURE — 56441 LYSIS OF LABIAL ADHESIONS: CPT | Mod: 59

## 2021-03-08 PROCEDURE — 56620 VULVECTOMY SIMPLE PARTIAL: CPT

## 2021-03-11 ENCOUNTER — APPOINTMENT (OUTPATIENT)
Dept: ORTHOPEDIC SURGERY | Facility: CLINIC | Age: 61
End: 2021-03-11
Payer: MEDICAID

## 2021-03-11 PROCEDURE — 73030 X-RAY EXAM OF SHOULDER: CPT | Mod: RT

## 2021-03-11 PROCEDURE — 99072 ADDL SUPL MATRL&STAF TM PHE: CPT

## 2021-03-11 PROCEDURE — 99214 OFFICE O/P EST MOD 30 MIN: CPT

## 2021-03-12 NOTE — HISTORY OF PRESENT ILLNESS
[Stable] : stable [___ mths] : [unfilled] month(s) ago [1] : a current pain level of 1/10 [Lifting] : worsened by lifting [Rest] : relieved by rest [de-identified] : ZEYAD is a 60 year old female who presents today for F/U evaluation of right shoulder greater tuberosity fracture 1/1/2021.  Patient fell while ice skating and endorsed immediate pain.  [facility] revealed a mildly displaced greater tuberosity fracture as well as calcific tendinitis.  She presents today w/o sling and reports she is feeling great. She reports improved pain and ROM. She denies use of OTC pain medications and numbness and tingling to the extremities. She has not started PT yet. No other complaints at this time.

## 2021-03-12 NOTE — DISCUSSION/SUMMARY
[de-identified] : ZEYAD is a 60 year old female who presents today for F/U evaluation of right shoulder greater tuberosity fracture 1/1/2021.  Patient fell while ice skating and endorsed immediate pain.  [facility] revealed a mildly displaced greater tuberosity fracture as well as calcific tendinitis.  She presents today w/o sling and reports she is feeling great. She reports improved pain and ROM. She denies use of OTC pain medications and numbness and tingling to the extremities. She has not started PT yet. No other complaints at this time. \par \par New radiographs today reveals proper interval healing noted. She reports minimal pain but there is stiffness on exam. At this time she will start PT 2x/week until seeing us again in 6 weeks. She agrees with the above plan and all questions were answered.\par

## 2021-03-12 NOTE — PHYSICAL EXAM
[de-identified] : Physical Exam:\par General: Well appearing, no acute distress, A&O\par Neurologic: A&Ox3, No focal deficits\par Head: NCAT without abrasions, lacerations, or ecchymosis to head, face, or scalp\par Eyes: No scleral icterus, no gross abnormalities\par Respiratory: Equal chest wall expansion bilaterally, no accessory muscle use\par Lymphatic: No lymphadenopathy palpated\par Skin: Warm and dry\par Psychiatric: Normal mood and affect\par \par Right Shoulder\par ·	Inspection/Palpation: Moderate swelling no ecchymosis right deltoid\par ·	Range of Motion: no crepitus with ROM; Active ; ER at side 25; IR to belt; Passive  with mild pain; ER at side 35 with mild pain; IR to belt\par ·	Strength: forward elevation in scapular plane [4/5], internal rotation [4/5], external rotation [4/5], adduction [4/5] and abduction [4/5]\par ·	Stability: no joint instability on provocative testing\par ·	Tests: Negrete test positive, Neer positive, bear hug test positive, Napolean sign negative, cross arm adduction negative, lift off sign positive, hornblowers sign negative, speeds test negative, Yergason's test negative, no bicipital groove tenderness, Nielsen's Active Compression test negative, whipple test [negative/positive], bicep's load II test negative\par \par Left Shoulder\par ·	Inspection/Palpation: no tenderness, swelling or deformities\par ·	Range of Motion: full and painless in all planes, no crepitus\par ·	Strength: forward elevation in scapular plane 5/5, internal rotation 5/5, external rotation 5/5, adduction 5/5 and abduction 5/5\par ·	Stability: no joint instability on provocative testing\par Tests: Negrete test negative, Neer sign negative, negative drop arm test secondary to pain, bear hug test negative, Napolean sign negative, cross arm adduction negative, lift off sign positive, hornblowers sign negative, speeds test negative, Yergason's test negative, no bicipital groove tenderness, Nielsen's Active Compression test negative [de-identified] : 4 views of the right shoulder performed today were reviewed with the patient.  They demonstrate a greater tuberosity proximal humerus fracture with healing noted as well as a large calcium deposit in the infraspinatus.

## 2021-03-22 ENCOUNTER — APPOINTMENT (OUTPATIENT)
Dept: OBGYN | Facility: CLINIC | Age: 61
End: 2021-03-22
Payer: MEDICAID

## 2021-03-22 VITALS
TEMPERATURE: 98 F | DIASTOLIC BLOOD PRESSURE: 72 MMHG | SYSTOLIC BLOOD PRESSURE: 128 MMHG | WEIGHT: 188.25 LBS | HEIGHT: 65 IN | BODY MASS INDEX: 31.36 KG/M2

## 2021-03-22 PROCEDURE — 99024 POSTOP FOLLOW-UP VISIT: CPT

## 2021-03-22 NOTE — HISTORY OF PRESENT ILLNESS
[FreeTextEntry1] : 61 yo s/p WLE 3/8 here for follow-up. Denies pain. Denies vaginal bleeding. States feeling well. Itching has improved. \par \par Path: lichen sclerosus with squamous hyperplasia\par \par Intraoperative and pathology findings reviewed with pt. Reassurance given regarding no evidence of malignancy. Discussed lichen sclerosus and need for clobetasol treatment once healed from surgery.

## 2021-03-22 NOTE — DISCUSSION/SUMMARY
[FreeTextEntry1] : 61 yo s/p WLE doing well postoperatively. \par -REturn in 6 weks for follow-up\par -DIscussed once healed will start clobetasol regimen for lichen sclerosus\par -Labial agglutination: asymptomatic, not sexually active, no further management needed at this time

## 2021-03-22 NOTE — PHYSICAL EXAM
[Appropriately responsive] : appropriately responsive [Alert] : alert [No Acute Distress] : no acute distress [Soft] : soft [Non-tender] : non-tender [Non-distended] : non-distended [No HSM] : No HSM [No Lesions] : no lesions [No Mass] : no mass [Oriented x3] : oriented x3 [Labia Majora] : normal on the left [Normal] : normal [FreeTextEntry1] : 1cm area of labial agglutination noted anteriorly and 1 cm posteriorly [FreeTextEntry2] : right labia: incision healing well, sutures in place, no erythema/drainage, hypopigmentation stable from previous

## 2021-04-19 ENCOUNTER — APPOINTMENT (OUTPATIENT)
Dept: ORTHOPEDIC SURGERY | Facility: CLINIC | Age: 61
End: 2021-04-19

## 2021-04-19 ENCOUNTER — APPOINTMENT (OUTPATIENT)
Dept: ORTHOPEDIC SURGERY | Facility: CLINIC | Age: 61
End: 2021-04-19
Payer: MEDICAID

## 2021-04-19 PROCEDURE — 99214 OFFICE O/P EST MOD 30 MIN: CPT

## 2021-04-19 PROCEDURE — 73030 X-RAY EXAM OF SHOULDER: CPT | Mod: RT

## 2021-04-19 PROCEDURE — 99072 ADDL SUPL MATRL&STAF TM PHE: CPT

## 2021-04-19 NOTE — PHYSICAL EXAM
[de-identified] : Physical Exam:\par General: Well appearing, no acute distress, A&O\par Neurologic: A&Ox3, No focal deficits\par Head: NCAT without abrasions, lacerations, or ecchymosis to head, face, or scalp\par Eyes: No scleral icterus, no gross abnormalities\par Respiratory: Equal chest wall expansion bilaterally, no accessory muscle use\par Lymphatic: No lymphadenopathy palpated\par Skin: Warm and dry\par Psychiatric: Normal mood and affect\par \par Right Shoulder\par ·	Inspection/Palpation: Moderate swelling no ecchymosis right deltoid\par ·	Range of Motion: no crepitus with ROM; Active ; ER at side 25; IR to belt; Passive  with mild pain; ER at side 35 with mild pain; IR to belt\par ·	Strength: forward elevation in scapular plane [4/5], internal rotation [4/5], external rotation [4/5], adduction [4/5] and abduction [4/5]\par ·	Stability: no joint instability on provocative testing\par ·	Tests: Negrete test positive, Neer positive, bear hug test positive, Napolean sign negative, cross arm adduction negative, lift off sign positive, hornblowers sign negative, speeds test negative, Yergason's test negative, no bicipital groove tenderness, Nielsen's Active Compression test negative, whipple test [negative/positive], bicep's load II test negative\par \par Left Shoulder\par ·	Inspection/Palpation: no tenderness, swelling or deformities\par ·	Range of Motion: full and painless in all planes, no crepitus\par ·	Strength: forward elevation in scapular plane 5/5, internal rotation 5/5, external rotation 5/5, adduction 5/5 and abduction 5/5\par ·	Stability: no joint instability on provocative testing\par Tests: Negrete test negative, Neer sign negative, negative drop arm test secondary to pain, bear hug test negative, Napolean sign negative, cross arm adduction negative, lift off sign positive, hornblowers sign negative, speeds test negative, Yergason's test negative, no bicipital groove tenderness, Nielsen's Active Compression test negative [de-identified] : 4 views of the right shoulder performed today were reviewed with the patient.  They demonstrate a greater tuberosity proximal humerus fracture with healing noted as well as a large calcium deposit in the infraspinatus.

## 2021-04-19 NOTE — HISTORY OF PRESENT ILLNESS
[de-identified] : ZEYAD is a 60 year old female who presents today for F/U evaluation of right shoulder greater tuberosity fracture 1/1/2021.  Patient fell while ice skating and endorsed immediate pain.  She presented to a [facility] where x-rays obtained revealed a mildly displaced greater tuberosity fracture as well as calcific tendinitis.  \par \par Currently, she reports she is feeling great. She reports improved pain and ROM. She denies use of OTC pain medications and numbness and tingling to the extremities.. She reports going to PT 2x/week with relief. It is still difficult for her to reach behind her back still and she is unable to forward flex fully but is happy with her progress.

## 2021-04-19 NOTE — DISCUSSION/SUMMARY
[de-identified] : ZEYAD is a 60 year old female who presents today for F/U evaluation of right shoulder greater tuberosity fracture 1/1/2021.  Patient fell while ice skating and endorsed immediate pain.  She presented to a [facility] where x-rays obtained revealed a mildly displaced greater tuberosity fracture as well as calcific tendinitis.  \par \par Currently, she reports she is feeling great. She reports improved pain and ROM. She denies use of OTC pain medications and numbness and tingling to the extremities.. She reports going to PT 2x/week with relief. It is still difficult for her to reach behind her back still and she is unable to forward flex fully but is happy with her progress.\par \par New radiographs performed today reveals proper interval healing noted. Fracture is still not fully healed and due to this I would like her to continue with formal PT 2x/week until we see her again in 6 weeks. She will continue HEP daily as well. At that time if her fracture is healed fully we will consider a perc lavage and aspiration to the calcium deposit that I believe is limiting her ROM.  If she is refractory at anytime we will consider an MRI. She agrees with the above plan and all questions were answered.\par

## 2021-04-23 ENCOUNTER — APPOINTMENT (OUTPATIENT)
Dept: FAMILY MEDICINE | Facility: CLINIC | Age: 61
End: 2021-04-23
Payer: MEDICAID

## 2021-04-23 VITALS
OXYGEN SATURATION: 98 % | TEMPERATURE: 97.7 F | HEART RATE: 86 BPM | BODY MASS INDEX: 31.32 KG/M2 | DIASTOLIC BLOOD PRESSURE: 80 MMHG | WEIGHT: 188 LBS | RESPIRATION RATE: 14 BRPM | SYSTOLIC BLOOD PRESSURE: 122 MMHG | HEIGHT: 65 IN

## 2021-04-23 DIAGNOSIS — Z01.818 ENCOUNTER FOR OTHER PREPROCEDURAL EXAMINATION: ICD-10-CM

## 2021-04-23 DIAGNOSIS — Z48.89 ENCOUNTER FOR OTHER SPECIFIED SURGICAL AFTERCARE: ICD-10-CM

## 2021-04-23 PROCEDURE — 99072 ADDL SUPL MATRL&STAF TM PHE: CPT

## 2021-04-23 PROCEDURE — 99213 OFFICE O/P EST LOW 20 MIN: CPT

## 2021-04-23 NOTE — ASSESSMENT
[FreeTextEntry1] : Basic cardiovascular prevention measures are advised including regular exercise, surveillance medical examination, and prudent portion-controlled low fat diet, rich in a variety of vegetables with minimal added sugars, refined starches, and no artificially hydrogenated oils.\par Discussion and interpretation of previous tests , external notes( labs, radiology, specialist  , patient verbalized understanding.\par Prescription drug management\par \par Labs to be drawn/ specimens obtained  at outside  lab    for evaluation of   assessed conditions - cbc cmp lipid    hgba1c for physical and screening purposes.\par mammogram breast us. \par

## 2021-04-23 NOTE — PHYSICAL EXAM
[Normal] : normal rate, regular rhythm, normal S1 and S2 and no murmur heard [de-identified] : right shoulder decreased rom

## 2021-04-23 NOTE — HISTORY OF PRESENT ILLNESS
[FreeTextEntry1] : pt presents for blood pressure check  [de-identified] : 59 yo wf here for follow up on htn \par Otherwise patient reports feeling well.  Patient specifically denies chest pain, shortness of breath, dyspnea on exertion, edema, PND, orthopnea, dizziness, or syncope. Patient reports compliance with medications. Patient denies fever, chills, night sweats, nausea, vomiting , no pain, erythema, swollen joints, hematuria, hematochezia , hematemesis, or melena.\par \par she had her surgery and it is healing.  she is going to do treatment for lichen sclerosis \par \par she is doing physical therapy for her right shoulder

## 2021-05-05 ENCOUNTER — APPOINTMENT (OUTPATIENT)
Dept: OBGYN | Facility: CLINIC | Age: 61
End: 2021-05-05
Payer: MEDICAID

## 2021-05-05 VITALS
SYSTOLIC BLOOD PRESSURE: 110 MMHG | WEIGHT: 188 LBS | RESPIRATION RATE: 16 BRPM | DIASTOLIC BLOOD PRESSURE: 70 MMHG | HEIGHT: 65 IN | BODY MASS INDEX: 31.32 KG/M2 | TEMPERATURE: 97.2 F

## 2021-05-05 PROCEDURE — 99024 POSTOP FOLLOW-UP VISIT: CPT

## 2021-05-05 NOTE — DISCUSSION/SUMMARY
[FreeTextEntry1] : 59 yo s/p WLE with lichen sclerosus doing well postoperatively and incision healed. \par -Discussed recommendation for clobetasol ointment for management of LS in preventing progression and managing symptoms. Discussed initiating nightly regimen x first 4 weeks then will taper\par -Clobetasol Rx sent to pharmacy, instructed on use and showed where to use ointment on the vulva\par -Vulvar hygiene discussed\par -Return in 4 weeks for follow-up

## 2021-05-05 NOTE — PHYSICAL EXAM
[Appropriately responsive] : appropriately responsive [Alert] : alert [No Acute Distress] : no acute distress [Soft] : soft [Non-tender] : non-tender [Non-distended] : non-distended [No HSM] : No HSM [No Lesions] : no lesions [No Mass] : no mass [Oriented x3] : oriented x3 [Normal] : normal [FreeTextEntry1] : 1cm area of labial agglutination noted anteriorly and 1 cm posteriorly [FreeTextEntry2] : incision  on right labia healed. Bilateral hypopigmentation with scattered areas of erythema, no lesions noted

## 2021-05-05 NOTE — HISTORY OF PRESENT ILLNESS
[FreeTextEntry1] : Pt here for follow-up. s/p WLE 3/8. Denies pain or vaginal bleeding. Occasional itching but improved from prior.

## 2021-05-27 ENCOUNTER — APPOINTMENT (OUTPATIENT)
Dept: ORTHOPEDIC SURGERY | Facility: CLINIC | Age: 61
End: 2021-05-27
Payer: MEDICAID

## 2021-05-27 PROCEDURE — 73030 X-RAY EXAM OF SHOULDER: CPT | Mod: RT

## 2021-05-27 PROCEDURE — 99214 OFFICE O/P EST MOD 30 MIN: CPT

## 2021-05-27 NOTE — DISCUSSION/SUMMARY
[de-identified] : ZEYAD is a 60 year old female who presents today for F/U evaluation of right shoulder greater tuberosity fracture 1/1/2021. Currently, she reports no pain in her shoulder. She displays improvement in ROM. At this time she reports she is doing well and is happy with her progress thus far.\par \par We had a thorough discussion regarding the nature of her pain, the pathophysiology, as well as all treatment options. Overall patient is doing very well, and is happy with her progress so far. We reviewed her xrays today that revealed a well healed right shoulder greater tuberosity fx. In regards to calcium deposit, we spoke in detail about treatment option. She is pain free at this time, however, is she experiences pain in future we will consider a ultrasound guided needle aspiration of calcium deposit. Patient will follow up in 6-8 wks for repeat clinical assessment. All questions were answered and the patient verbalized understanding. The patient is in agreement with this treatment plan. \par \par

## 2021-05-27 NOTE — PHYSICAL EXAM
[de-identified] : Physical Exam:\par General: Well appearing, no acute distress, A&O\par Neurologic: A&Ox3, No focal deficits\par Head: NCAT without abrasions, lacerations, or ecchymosis to head, face, or scalp\par Eyes: No scleral icterus, no gross abnormalities\par Respiratory: Equal chest wall expansion bilaterally, no accessory muscle use\par Lymphatic: No lymphadenopathy palpated\par Skin: Warm and dry\par Psychiatric: Normal mood and affect\par \par right  Shoulder\par ·	Inspection/Palpation: no tenderness, swelling or deformities\par ·	Range of Motion: full and painless in all planes, no crepitus\par ·	Strength: forward elevation in scapular plane 5/5, internal rotation 5/5, external rotation 5/5, adduction 5/5 and abduction 5/5\par ·	Stability: no joint instability on provocative testing\par Tests: Engrete test negative, Neer sign negative, negative drop arm test secondary to pain, bear hug test negative, Napolean sign negative, cross arm adduction negative, lift off sign positive, hornblowers sign negative, speeds test negative, Yergason's test negative, no bicipital groove tenderness, Nielsen's Active Compression test negative\par \par Left Shoulder\par ·	Inspection/Palpation: no tenderness, swelling or deformities\par ·	Range of Motion: full and painless in all planes, no crepitus\par ·	Strength: forward elevation in scapular plane 5/5, internal rotation 5/5, external rotation 5/5, adduction 5/5 and abduction 5/5\par ·	Stability: no joint instability on provocative testing\par Tests: Negrete test negative, Neer sign negative, negative drop arm test secondary to pain, bear hug test negative, Napolean sign negative, cross arm adduction negative, lift off sign positive, hornblowers sign negative, speeds test negative, Yergason's test negative, no bicipital groove tenderness, Nielsen's Active Compression test negative [de-identified] : 3 views of the right shoulder performed today were reviewed with the patient.  They demonstrate a greater tuberosity proximal humerus fracture with healing noted as well as a large calcium deposit in the infraspinatus.

## 2021-05-27 NOTE — HISTORY OF PRESENT ILLNESS
[Improving] : improving [1] : a current pain level of 1/10 [Lifting] : worsened by lifting [Rest] : relieved by rest [___ mths] : [unfilled] month(s) ago [de-identified] : ZEYAD is a 60 year old female who presents today for F/U evaluation of right shoulder greater tuberosity fracture 1/1/2021. Currently, she reports no pain in her shoulder. She displays improvement in ROM. At this time she reports she is doing well and is happy with her progress thus far.

## 2021-05-27 NOTE — ADDENDUM
[FreeTextEntry1] : Documented by Gaudencio Hebert acting as a scribe for Dr. Moncada on 05/27/2021. \par \par All medical record entries made by the Scribe were at my, Dr. Moncada's, direction and\par personally dictated by me on 05/27/2021. I have reviewed the chart and agree that the record\par accurately reflects my personal performance of the history, physical exam, procedure and imaging.

## 2021-06-11 ENCOUNTER — APPOINTMENT (OUTPATIENT)
Dept: OBGYN | Facility: CLINIC | Age: 61
End: 2021-06-11
Payer: MEDICAID

## 2021-06-11 VITALS
TEMPERATURE: 97.3 F | HEIGHT: 65 IN | BODY MASS INDEX: 31.32 KG/M2 | SYSTOLIC BLOOD PRESSURE: 136 MMHG | RESPIRATION RATE: 16 BRPM | WEIGHT: 188 LBS | DIASTOLIC BLOOD PRESSURE: 70 MMHG

## 2021-06-11 PROCEDURE — 99213 OFFICE O/P EST LOW 20 MIN: CPT

## 2021-06-11 NOTE — PHYSICAL EXAM
[Appropriately responsive] : appropriately responsive [Alert] : alert [No Acute Distress] : no acute distress [Soft] : soft [Non-tender] : non-tender [Non-distended] : non-distended [No HSM] : No HSM [No Lesions] : no lesions [No Mass] : no mass [Oriented x3] : oriented x3 [Normal] : normal [FreeTextEntry1] : 1cm area of labial agglutination noted anteriorly and 1 cm posteriorly [FreeTextEntry2] : bilateral hypopigmentation, no erythema, no lesions noted [FreeTextEntry4] : 1cm erythema noted posterior fourchette, 1cm fissure noted posterior perineum near rectum

## 2021-06-11 NOTE — HISTORY OF PRESENT ILLNESS
[FreeTextEntry1] : Pt here for follow-up.  Has been using clobetasol nightly since last visit. States resolution of itching and improvement in texture of skin. No complaints today.

## 2021-06-11 NOTE — DISCUSSION/SUMMARY
[FreeTextEntry1] : 59 yo with lichen sclerosus improving with clobetasol.\par -Change to clobetasol every other night x 4 weeks, then 3x/week x 4 weeks\par -Return in 2 months for reevaluation

## 2021-07-23 ENCOUNTER — APPOINTMENT (OUTPATIENT)
Dept: FAMILY MEDICINE | Facility: CLINIC | Age: 61
End: 2021-07-23
Payer: MEDICAID

## 2021-07-23 VITALS
SYSTOLIC BLOOD PRESSURE: 120 MMHG | DIASTOLIC BLOOD PRESSURE: 80 MMHG | HEIGHT: 65 IN | OXYGEN SATURATION: 98 % | HEART RATE: 80 BPM | BODY MASS INDEX: 31.32 KG/M2 | TEMPERATURE: 97.6 F | RESPIRATION RATE: 16 BRPM | WEIGHT: 188 LBS

## 2021-07-23 PROCEDURE — 99213 OFFICE O/P EST LOW 20 MIN: CPT

## 2021-07-23 NOTE — HISTORY OF PRESENT ILLNESS
[FreeTextEntry1] : pt presents for blood pressure check  [de-identified] : 59 yo wf here for follow up on htn   My mom had a stroke. she is in rehab.  It has been extremely stressful. \par Otherwise patient reports feeling well.  Patient specifically denies chest pain, shortness of breath, dyspnea on exertion, edema, PND, orthopnea, dizziness, or syncope. Patient reports compliance with medications. Patient denies fever, chills, night sweats, nausea, vomiting , no pain, erythema, swollen joints, hematuria, hematochezia , hematemesis, or melena.\par \par  Her lichen planus is good and her shoulder is good\par \par

## 2021-07-23 NOTE — ASSESSMENT
[FreeTextEntry1] : Basic cardiovascular prevention measures are advised including regular exercise, surveillance medical examination, and prudent portion-controlled low fat diet, rich in a variety of vegetables with minimal added sugars, refined starches, and no artificially hydrogenated oils.\par Discussion and interpretation of previous tests , external notes( labs, radiology, specialist  , patient verbalized understanding.\par Prescription drug management\par  renew telmisartan/hctz \par mammogram breast us. done 6/21\par

## 2021-07-23 NOTE — CURRENT MEDS
[Takes medication as prescribed] : takes [Yes] : Reviewed medication list for presence of high-risk medications. [None] : Patient does not have any barriers to medication adherence

## 2021-07-23 NOTE — PHYSICAL EXAM
[Normal] : normal rate, regular rhythm, normal S1 and S2 and no murmur heard [de-identified] : right shoulder decreased rom

## 2021-08-17 ENCOUNTER — APPOINTMENT (OUTPATIENT)
Dept: OBGYN | Facility: CLINIC | Age: 61
End: 2021-08-17
Payer: MEDICAID

## 2021-08-17 VITALS
SYSTOLIC BLOOD PRESSURE: 124 MMHG | RESPIRATION RATE: 16 BRPM | BODY MASS INDEX: 31.32 KG/M2 | DIASTOLIC BLOOD PRESSURE: 68 MMHG | TEMPERATURE: 96.5 F | WEIGHT: 188 LBS | HEIGHT: 65 IN

## 2021-08-17 DIAGNOSIS — Q52.5 FUSION OF LABIA: ICD-10-CM

## 2021-08-17 PROCEDURE — 99213 OFFICE O/P EST LOW 20 MIN: CPT

## 2021-08-17 NOTE — DISCUSSION/SUMMARY
[FreeTextEntry1] : 61 yo with lichen sclerosus, labial agglutination, currently asymptomatic.\par -Continue clobetasol twice weekly\par -Vulvar hygiene discussed\par -Return in 6 months for follow-up

## 2021-08-17 NOTE — HISTORY OF PRESENT ILLNESS
[FreeTextEntry1] : Pt here for follow-up.  Currently using clobetasol twice weekly since last visit. Has only had itching when she forgets to apply ointment. Denies skin changes.

## 2021-08-17 NOTE — PHYSICAL EXAM
[Appropriately responsive] : appropriately responsive [Alert] : alert [No Acute Distress] : no acute distress [Soft] : soft [Non-tender] : non-tender [Non-distended] : non-distended [No HSM] : No HSM [No Lesions] : no lesions [No Mass] : no mass [Oriented x3] : oriented x3 [Normal] : normal [Atrophy] : atrophy [Dry Mucosa] : dry mucosa [FreeTextEntry1] : 1cm area of labial agglutination noted anteriorly and 1 cm posteriorly [FreeTextEntry2] : bilateral hypopigmentation, no erythema, no lesions noted [FreeTextEntry4] :  erythema at posterior fourchette improved, fissure near rectum no longer present

## 2021-12-02 ENCOUNTER — APPOINTMENT (OUTPATIENT)
Dept: FAMILY MEDICINE | Facility: CLINIC | Age: 61
End: 2021-12-02
Payer: MEDICAID

## 2021-12-02 VITALS
RESPIRATION RATE: 14 BRPM | DIASTOLIC BLOOD PRESSURE: 90 MMHG | BODY MASS INDEX: 30.66 KG/M2 | HEART RATE: 85 BPM | SYSTOLIC BLOOD PRESSURE: 150 MMHG | WEIGHT: 184 LBS | HEIGHT: 65 IN | OXYGEN SATURATION: 98 %

## 2021-12-02 VITALS — TEMPERATURE: 98.3 F

## 2021-12-02 DIAGNOSIS — R09.89 OTHER SPECIFIED SYMPTOMS AND SIGNS INVOLVING THE CIRCULATORY AND RESPIRATORY SYSTEMS: ICD-10-CM

## 2021-12-02 PROCEDURE — 99213 OFFICE O/P EST LOW 20 MIN: CPT

## 2021-12-02 NOTE — ASSESSMENT
[FreeTextEntry1] : Basic cardiovascular prevention measures are advised including regular exercise, surveillance medical examination, and prudent portion-controlled low fat diet, rich in a variety of vegetables with minimal added sugars, refined starches, and no artificially hydrogenated oils.\par Discussion and interpretation of previous tests , external notes( labs, radiology, specialist  , patient verbalized understanding.\par Prescription drug management\par try xanax check bp before and 1 hr and 2 hr after .If goal 140/84 is achieved then she should be fine for her procedure\par \par if bp goal is not achieved she can try 2 tabs of xanax this saturday check bp beofre and 1 hr and 2 hr later .\par \par pt has to be able to drive on this medications  if she is too drowsy I will send a diuretic of a diff kind not hctz.

## 2021-12-02 NOTE — HISTORY OF PRESENT ILLNESS
[FreeTextEntry8] : pt presents for blood pressure check \par pt c/o b/p high 170/90\par usual 125/135/80\par  she went to the dentist at [facility] and they take bp and it was high they don’t do any work on the teeth if bp is high. they advised to come here  \par I cut out salt I cook my own food i lost weight i don’t exercise or do yoga as i used to my job is sedentary.\par

## 2022-01-18 ENCOUNTER — APPOINTMENT (OUTPATIENT)
Dept: FAMILY MEDICINE | Facility: CLINIC | Age: 62
End: 2022-01-18

## 2022-01-26 ENCOUNTER — APPOINTMENT (OUTPATIENT)
Dept: OBGYN | Facility: CLINIC | Age: 62
End: 2022-01-26
Payer: MEDICAID

## 2022-01-26 VITALS
BODY MASS INDEX: 30.82 KG/M2 | WEIGHT: 185 LBS | TEMPERATURE: 97.4 F | HEIGHT: 65 IN | SYSTOLIC BLOOD PRESSURE: 124 MMHG | RESPIRATION RATE: 16 BRPM | DIASTOLIC BLOOD PRESSURE: 84 MMHG

## 2022-01-26 PROCEDURE — 99213 OFFICE O/P EST LOW 20 MIN: CPT

## 2022-01-28 NOTE — PHYSICAL EXAM
[Appropriately responsive] : appropriately responsive [Alert] : alert [No Acute Distress] : no acute distress [Soft] : soft [Non-tender] : non-tender [Non-distended] : non-distended [No HSM] : No HSM [No Lesions] : no lesions [No Mass] : no mass [Oriented x3] : oriented x3 [Normal] : normal [Atrophy] : atrophy [Dry Mucosa] : dry mucosa [FreeTextEntry1] : 1cm area of labial agglutination noted anteriorly and 1 cm posteriorly [FreeTextEntry2] : bilateral hypopigmentation, no erythema, no lesions noted

## 2022-01-28 NOTE — HISTORY OF PRESENT ILLNESS
[FreeTextEntry1] : Pt here for follow-up. She continues to use clobetasol twice weekly in the affected areas. Denies any symptoms of dryness, itching. Denies bleeding.

## 2022-01-28 NOTE — DISCUSSION/SUMMARY
[FreeTextEntry1] : 60 yo with lichen sclerosus, labial agglutination, currently asymptomatic.\par -Continue clobetasol twice weekly\par -Continue vulvar hygiene measures\par -Return for annual/follow-up

## 2022-02-02 ENCOUNTER — TRANSCRIPTION ENCOUNTER (OUTPATIENT)
Age: 62
End: 2022-02-02

## 2022-02-17 ENCOUNTER — TRANSCRIPTION ENCOUNTER (OUTPATIENT)
Age: 62
End: 2022-02-17

## 2022-02-23 ENCOUNTER — APPOINTMENT (OUTPATIENT)
Dept: FAMILY MEDICINE | Facility: CLINIC | Age: 62
End: 2022-02-23
Payer: MEDICAID

## 2022-02-23 PROCEDURE — 99214 OFFICE O/P EST MOD 30 MIN: CPT | Mod: 95

## 2022-02-23 NOTE — PLAN
[FreeTextEntry1] : \par acute upper respiratory infection\par augmentin 875-125mg po bid x 10 days\par humidifier use advised\par flonase/azelastine\par increase fluids\par covid test was negative\par probiotic po daily\par fluconazole prn in case of yeast infection requested by patient\par \par \par hx htn\par bp today at home is 173/83\par this morning it was 120 systolic \par advised to recheck when relaxed in 1 hr and call back\par she has cpe scheduled in 2 weks\par advised if bp very high to go to ED \par if bp not controlled advised to return to office sooner \par \par f/u 3 days if no relief, pt agreed w/plan\par \par ADDENDUM PT CALLED BACK AND BP /69 WHEN SHE WAS RELAXED

## 2022-02-23 NOTE — HISTORY OF PRESENT ILLNESS
[Home] : at home, [unfilled] , at the time of the visit. [Medical Office: ([facility])___] : at the medical office located in  [Verbal consent obtained from patient] : the patient, [unfilled] [FreeTextEntry8] : 60yo F presents for c/o right ear pressure and sinus pressure and cough\par \par she was on a flight and having right ear congestion and pain \par covid was negative \par she had a cough \par she went to [facility] covid test was negative\par she was told she had fluid in right ear and chest sounded clear\par she said she was given nasal spray ipratropium nasal spray\par right ear still feels congested \par yesterday she had right maxillary sinus pain\par she said she has postnasal drip\par when she lays down at night she doesn’t cough\par she coughs when air hits the back of her throat\par denies green or yellow discharge\par she said her bp goes up from nasal decongestants \par \par bp was high in urgent care 173/89   164/96\par she had been taking decongestants and then her bp improved \par last time taking decongestants was friday\par bp today at home is 173/83\par this morning it was 120 systolic \par denies eating salt\par she is feeling nervous and she said " the minute i sat down my heart was racing"\par denies headache \par

## 2022-02-23 NOTE — PHYSICAL EXAM
[Normal] : no acute distress, well nourished, well developed and well-appearing [Normal Sclera/Conjunctiva] : normal sclera/conjunctiva [EOMI] : extraocular movements intact [Normal Outer Ear/Nose] : the outer ears and nose were normal in appearance [Normal Oropharynx] : the oropharynx was normal [No Respiratory Distress] : no respiratory distress  [No Accessory Muscle Use] : no accessory muscle use [No Focal Deficits] : no focal deficits [Normal Affect] : the affect was normal [Normal Mood] : the mood was normal [Normal Insight/Judgement] : insight and judgment were intact [de-identified] : no swelling in neck as per patient palpating [de-identified] : cough sounds dry , normal chest rise with inhalation and chest fall with exhalation \par \par  [de-identified] : no calf tenderness b/l LE when pt palpates

## 2022-02-23 NOTE — REVIEW OF SYSTEMS
[Earache] : earache [Nasal Discharge] : nasal discharge [Postnasal Drip] : postnasal drip [Cough] : cough [Fever] : no fever [Chills] : no chills [Hearing Loss] : no hearing loss [Sore Throat] : no sore throat [Chest Pain] : no chest pain [Shortness Of Breath] : no shortness of breath [Headache] : no headache [FreeTextEntry4] : right ear pain only

## 2022-02-24 ENCOUNTER — NON-APPOINTMENT (OUTPATIENT)
Age: 62
End: 2022-02-24

## 2022-03-10 ENCOUNTER — APPOINTMENT (OUTPATIENT)
Dept: FAMILY MEDICINE | Facility: CLINIC | Age: 62
End: 2022-03-10
Payer: MEDICAID

## 2022-03-10 VITALS
DIASTOLIC BLOOD PRESSURE: 88 MMHG | TEMPERATURE: 97.3 F | SYSTOLIC BLOOD PRESSURE: 134 MMHG | WEIGHT: 185 LBS | BODY MASS INDEX: 30.82 KG/M2 | OXYGEN SATURATION: 98 % | RESPIRATION RATE: 15 BRPM | HEART RATE: 95 BPM | HEIGHT: 65 IN

## 2022-03-10 VITALS — SYSTOLIC BLOOD PRESSURE: 132 MMHG | DIASTOLIC BLOOD PRESSURE: 80 MMHG

## 2022-03-10 DIAGNOSIS — Z13.1 ENCOUNTER FOR SCREENING FOR DIABETES MELLITUS: ICD-10-CM

## 2022-03-10 DIAGNOSIS — Z13.29 ENCOUNTER FOR SCREENING FOR OTHER SUSPECTED ENDOCRINE DISORDER: ICD-10-CM

## 2022-03-10 DIAGNOSIS — N95.2 POSTMENOPAUSAL ATROPHIC VAGINITIS: ICD-10-CM

## 2022-03-10 DIAGNOSIS — S42.294A OTHER NONDISPLACED FRACTURE OF UPPER END OF RIGHT HUMERUS, INITIAL ENCOUNTER FOR CLOSED FRACTURE: ICD-10-CM

## 2022-03-10 DIAGNOSIS — Z12.39 ENCOUNTER FOR OTHER SCREENING FOR MALIGNANT NEOPLASM OF BREAST: ICD-10-CM

## 2022-03-10 DIAGNOSIS — J06.9 ACUTE UPPER RESPIRATORY INFECTION, UNSPECIFIED: ICD-10-CM

## 2022-03-10 DIAGNOSIS — Z13.220 ENCOUNTER FOR SCREENING FOR LIPOID DISORDERS: ICD-10-CM

## 2022-03-10 DIAGNOSIS — Z13.0 ENCOUNTER FOR SCREENING FOR DISEASES OF THE BLOOD AND BLOOD-FORMING ORGANS AND CERTAIN DISORDERS INVOLVING THE IMMUNE MECHANISM: ICD-10-CM

## 2022-03-10 PROCEDURE — 99396 PREV VISIT EST AGE 40-64: CPT

## 2022-03-10 RX ORDER — ALPRAZOLAM 0.25 MG/1
0.25 TABLET ORAL DAILY
Qty: 30 | Refills: 0 | Status: DISCONTINUED | COMMUNITY
Start: 2021-12-02 | End: 2022-03-10

## 2022-03-10 RX ORDER — FLUCONAZOLE 150 MG/1
150 TABLET ORAL
Qty: 1 | Refills: 0 | Status: DISCONTINUED | COMMUNITY
Start: 2022-02-23 | End: 2022-03-10

## 2022-03-10 RX ORDER — FLUTICASONE PROPIONATE 50 UG/1
50 SPRAY, METERED NASAL TWICE DAILY
Qty: 1 | Refills: 2 | Status: DISCONTINUED | COMMUNITY
Start: 2022-02-23 | End: 2022-03-10

## 2022-03-10 RX ORDER — LANCETS 33 GAUGE
EACH MISCELLANEOUS
Qty: 200 | Refills: 0 | Status: DISCONTINUED | COMMUNITY
Start: 2018-05-15 | End: 2022-03-10

## 2022-03-10 RX ORDER — AZELASTINE HYDROCHLORIDE 137 UG/1
0.1 SPRAY, METERED NASAL TWICE DAILY
Qty: 1 | Refills: 0 | Status: DISCONTINUED | COMMUNITY
Start: 2022-02-23 | End: 2022-03-10

## 2022-03-10 NOTE — HISTORY OF PRESENT ILLNESS
[FreeTextEntry1] : Patient presents for physical exam and script for labs  [de-identified] : She reports that her blood pressure at home has been in the normal, today it was 132/63.  She would like to try vaginal estrogen cream to maintain her pelvic health.  She would like to see a cardiologist, there's a family history of stroke.  She never started the alprazolam

## 2022-03-10 NOTE — HEALTH RISK ASSESSMENT
[FreeTextEntry1] : see HPI [de-identified] : Dr Jimenez (GYN), Dr Hermosillo (Derm) [de-identified] : see SBIRT [de-identified] : exercises regularly [de-identified] : regular [Change in mental status noted] : No change in mental status noted [Language] : denies difficulty with language [Behavior] : denies difficulty with behavior [Learning/Retaining New Information] : denies difficulty learning/retaining new information [Handling Complex Tasks] : denies difficulty handling complex tasks [Reasoning] : denies difficulty with reasoning [Spatial Ability and Orientation] : denies difficulty with spatial ability and orientation [Reports changes in hearing] : Reports no changes in hearing [Reports changes in vision] : Reports no changes in vision [Reports changes in dental health] : Reports no changes in dental health [Guns at Home] : no guns at home [Travel to Developing Areas] : does not  travel to developing areas [TB Exposure] : is not being exposed to tuberculosis [Caregiver Concerns] : does not have caregiver concerns [ColonoscopyDate] : 08/19 [AdvancecareDate] : 03/22

## 2022-03-10 NOTE — PLAN
[FreeTextEntry1] : her BP monitor was checked and the readings were very similar to my reading, labs were ordered, mammogram and cardiology consultation ordered and she will start a low dose of vaginal estrogen cream

## 2022-05-11 ENCOUNTER — MED ADMIN CHARGE (OUTPATIENT)
Age: 62
End: 2022-05-11

## 2022-05-11 ENCOUNTER — TRANSCRIPTION ENCOUNTER (OUTPATIENT)
Age: 62
End: 2022-05-11

## 2022-05-11 ENCOUNTER — APPOINTMENT (OUTPATIENT)
Dept: FAMILY MEDICINE | Facility: CLINIC | Age: 62
End: 2022-05-11
Payer: MEDICAID

## 2022-05-11 VITALS — TEMPERATURE: 98.4 F

## 2022-05-11 PROCEDURE — 86580 TB INTRADERMAL TEST: CPT

## 2022-05-13 ENCOUNTER — APPOINTMENT (OUTPATIENT)
Dept: FAMILY MEDICINE | Facility: CLINIC | Age: 62
End: 2022-05-13

## 2022-05-18 ENCOUNTER — APPOINTMENT (OUTPATIENT)
Dept: FAMILY MEDICINE | Facility: CLINIC | Age: 62
End: 2022-05-18
Payer: MEDICAID

## 2022-05-18 ENCOUNTER — TRANSCRIPTION ENCOUNTER (OUTPATIENT)
Age: 62
End: 2022-05-18

## 2022-05-18 ENCOUNTER — MED ADMIN CHARGE (OUTPATIENT)
Age: 62
End: 2022-05-18

## 2022-05-18 VITALS — TEMPERATURE: 98 F

## 2022-05-18 PROCEDURE — 86580 TB INTRADERMAL TEST: CPT

## 2022-05-20 ENCOUNTER — TRANSCRIPTION ENCOUNTER (OUTPATIENT)
Age: 62
End: 2022-05-20

## 2022-05-20 ENCOUNTER — APPOINTMENT (OUTPATIENT)
Dept: FAMILY MEDICINE | Facility: CLINIC | Age: 62
End: 2022-05-20
Payer: MEDICAID

## 2022-05-20 ENCOUNTER — APPOINTMENT (OUTPATIENT)
Dept: FAMILY MEDICINE | Facility: CLINIC | Age: 62
End: 2022-05-20

## 2022-05-20 VITALS — TEMPERATURE: 97.6 F

## 2022-05-20 PROCEDURE — ZZZZZ: CPT

## 2022-08-10 ENCOUNTER — MED ADMIN CHARGE (OUTPATIENT)
Age: 62
End: 2022-08-10

## 2022-08-10 ENCOUNTER — TRANSCRIPTION ENCOUNTER (OUTPATIENT)
Age: 62
End: 2022-08-10

## 2022-08-10 ENCOUNTER — APPOINTMENT (OUTPATIENT)
Dept: FAMILY MEDICINE | Facility: CLINIC | Age: 62
End: 2022-08-10

## 2022-08-10 PROCEDURE — 86580 TB INTRADERMAL TEST: CPT

## 2022-08-12 ENCOUNTER — TRANSCRIPTION ENCOUNTER (OUTPATIENT)
Age: 62
End: 2022-08-12

## 2022-08-12 ENCOUNTER — APPOINTMENT (OUTPATIENT)
Dept: FAMILY MEDICINE | Facility: CLINIC | Age: 62
End: 2022-08-12

## 2022-08-12 PROCEDURE — ZZZZZ: CPT

## 2022-09-13 ENCOUNTER — APPOINTMENT (OUTPATIENT)
Dept: FAMILY MEDICINE | Facility: CLINIC | Age: 62
End: 2022-09-13

## 2022-09-13 ENCOUNTER — NON-APPOINTMENT (OUTPATIENT)
Age: 62
End: 2022-09-13

## 2022-09-13 VITALS
HEIGHT: 65 IN | DIASTOLIC BLOOD PRESSURE: 92 MMHG | WEIGHT: 187 LBS | HEART RATE: 82 BPM | BODY MASS INDEX: 31.16 KG/M2 | SYSTOLIC BLOOD PRESSURE: 124 MMHG | RESPIRATION RATE: 15 BRPM | OXYGEN SATURATION: 98 %

## 2022-09-13 VITALS — TEMPERATURE: 98.7 F

## 2022-09-13 DIAGNOSIS — Z23 ENCOUNTER FOR IMMUNIZATION: ICD-10-CM

## 2022-09-13 PROCEDURE — G0008: CPT

## 2022-09-13 PROCEDURE — 90686 IIV4 VACC NO PRSV 0.5 ML IM: CPT

## 2022-09-13 PROCEDURE — 93000 ELECTROCARDIOGRAM COMPLETE: CPT

## 2022-09-13 PROCEDURE — 99213 OFFICE O/P EST LOW 20 MIN: CPT | Mod: 25

## 2022-09-13 NOTE — ASSESSMENT
[FreeTextEntry1] : Basic cardiovascular prevention measures are advised including regular exercise, surveillance medical examination, and prudent portion-controlled low fat diet, rich in a variety of vegetables with minimal added sugars, refined starches, and no artificially hydrogenated oils.\par Discussion and interpretation of previous tests , external notes( labs, radiology, specialist  , patient verbalized understanding.\par Prescription drug management\par telmisartan for 6 m\par Labs drawn/ specimens obtained  in office on this date of service  for evaluation of   assessed conditions - cbc cmp llpid     to be run at [facility].\par EKG performed on this day in the office  to evaluate for any ischemia, new arrhythmia, and for any significant changes and reviewed by THERESE Santiago. It is stable.\par mammogram\par  Information regarding flu shot reviewed. All questions answered.Flu shot .5 cc IM   l   deltoid . No reaction noted. Advised patients to wash hands to reduce the spread of infection.\par

## 2022-09-13 NOTE — HISTORY OF PRESENT ILLNESS
[FreeTextEntry1] : patient presents for BP check  [de-identified] : 62 yo wf here for follow up for htn. Otherwise patient reports feeling well.  Patient specifically denies chest pain, shortness of breath, dyspnea on exertion, edema, PND, orthopnea, dizziness, or syncope. Patient reports compliance with medications. Patient denies fever, chills, night sweats, nausea, vomiting , no pain, erythema, swollen joints, hematuria, hematochezia , hematemesis, or melena.\par she did get covid in April but no symptoms really severe. she had a little allergy eyes she just screened herself before she was visiting a friend and it was positive. \par she logs her bp and it is good shahnaz when she does yoga

## 2022-09-19 NOTE — ASSESSMENT
[FreeTextEntry1] : 59 y/o being referred for evaluation of vulvar lesion s/p biopsy consistent with lichen sclerosis. Physical examination significant for raised 1 cm lesion at 10 o'clock on right labia minora,concerning for invasion. Erythema of b/l vaginal introitus. Fusion of labia minora c/w LS. Orbital.../Buccal...

## 2022-11-03 ENCOUNTER — APPOINTMENT (OUTPATIENT)
Dept: FAMILY MEDICINE | Facility: CLINIC | Age: 62
End: 2022-11-03

## 2022-11-03 VITALS
HEART RATE: 65 BPM | HEIGHT: 65 IN | OXYGEN SATURATION: 98 % | BODY MASS INDEX: 31.16 KG/M2 | WEIGHT: 187 LBS | SYSTOLIC BLOOD PRESSURE: 120 MMHG | DIASTOLIC BLOOD PRESSURE: 78 MMHG | RESPIRATION RATE: 12 BRPM

## 2022-11-03 VITALS — TEMPERATURE: 97.2 F

## 2022-11-03 PROCEDURE — 99213 OFFICE O/P EST LOW 20 MIN: CPT

## 2022-11-03 NOTE — PHYSICAL EXAM
[de-identified] : left eye conjuntival  injeciton medial eye  no corneal abrasion w flourescien exam  no fb w eye lid eversion

## 2022-11-03 NOTE — REVIEW OF SYSTEMS
[Discharge] : no discharge [Pain] : no pain [Redness] : redness [Vision Problems] : no vision problems [Itching] : itching

## 2022-11-03 NOTE — ASSESSMENT
[FreeTextEntry1] : Take antibiotics for eyes no eye make don’t rub eye . ,  rest,  increase fluids, wash hands to prevent the spread of  infection .  \par no corneal abrasion\par follow up eye dr yearly\par We spent sufficient time to discuss aspects of care; questions were answered  to patient's satisfaction.The diagnosis and care plan were discussed with patient in detail.  Patient test results were  reviewed and explained in full. All questions and concerns  were answered to the best of my knowledge.\par

## 2022-11-07 ENCOUNTER — APPOINTMENT (OUTPATIENT)
Dept: OBGYN | Facility: CLINIC | Age: 62
End: 2022-11-07

## 2022-11-07 VITALS
WEIGHT: 180 LBS | RESPIRATION RATE: 16 BRPM | HEIGHT: 65 IN | SYSTOLIC BLOOD PRESSURE: 118 MMHG | DIASTOLIC BLOOD PRESSURE: 68 MMHG | BODY MASS INDEX: 29.99 KG/M2

## 2022-11-07 DIAGNOSIS — Z01.419 ENCOUNTER FOR GYNECOLOGICAL EXAMINATION (GENERAL) (ROUTINE) W/OUT ABNORMAL FINDINGS: ICD-10-CM

## 2022-11-07 DIAGNOSIS — L90.0 LICHEN SCLEROSUS ET ATROPHICUS: ICD-10-CM

## 2022-11-07 PROCEDURE — 99396 PREV VISIT EST AGE 40-64: CPT

## 2022-11-07 RX ORDER — CLOBETASOL PROPIONATE 0.5 MG/G
0.05 OINTMENT TOPICAL DAILY
Qty: 1 | Refills: 5 | Status: ACTIVE | COMMUNITY
Start: 2021-05-05 | End: 1900-01-01

## 2022-11-07 NOTE — PHYSICAL EXAM
[Chaperone Present] : A chaperone was present in the examining room during all aspects of the physical examination [Appropriately responsive] : appropriately responsive [Alert] : alert [No Acute Distress] : no acute distress [Soft] : soft [Non-tender] : non-tender [Non-distended] : non-distended [No HSM] : No HSM [No Lesions] : no lesions [No Mass] : no mass [Oriented x3] : oriented x3 [Examination Of The Breasts] : a normal appearance [No Discharge] : no discharge [No Masses] : no breast masses were palpable [Normal] : normal [Atrophy] : atrophy [Dry Mucosa] : dry mucosa [FreeTextEntry1] : 1cm area of labial agglutination noted anteriorly and 1 cm posteriorly, stable from prior [FreeTextEntry2] : bilateral hypopigmentation, no erythema, no lesions noted

## 2022-11-07 NOTE — HISTORY OF PRESENT ILLNESS
[Patient reported mammogram was normal] : Patient reported mammogram was normal [Patient reported colonoscopy was normal] : Patient reported colonoscopy was normal [postmenopausal] : postmenopausal [N] : Patient is not sexually active [Y] : Positive pregnancy history [TextBox_4] : 60 yo here for annual. Hx lichen sclerosus, labial agglutination. Using clobetasol twice weekly. Has also started applying Premarin cream externally 2-3x/week when she remembers. Uses coconut oil as well. Denies itching or any symptoms.  [Mammogramdate] : 6/2021 [TextBox_19] : pt has referral for appt [TextBox_31] : repeat today [ColonoscopyDate] : 2019 [PGHxTotal] : 3 [[facility]] : 3 [[facility] Hospital[facility]] : 3 [FreeTextEntry1] : NSVDx3. Denies cysts, fibroids, abnormal pap, STI

## 2022-11-07 NOTE — DISCUSSION/SUMMARY
[FreeTextEntry1] : 60 yo here for well woman exam. \par 1) Health maintenance: \par Pap + HPV\par Mammogram: has referral and appt next week\par Up to date on colonoscopy\par \par 2) Lichen sclerosis: \par Stable, no changes in exam today\par Continue clobetasol twice weekly\par Premarin cream 2-3x/week externally\par Coconut oil prn\par \par Return in 1 yr or prn

## 2022-11-09 LAB — HPV HIGH+LOW RISK DNA PNL CVX: NOT DETECTED

## 2022-11-10 ENCOUNTER — RX RENEWAL (OUTPATIENT)
Age: 62
End: 2022-11-10

## 2022-11-15 LAB — CYTOLOGY CVX/VAG DOC THIN PREP: ABNORMAL

## 2023-03-15 ENCOUNTER — APPOINTMENT (OUTPATIENT)
Dept: FAMILY MEDICINE | Facility: CLINIC | Age: 63
End: 2023-03-15
Payer: COMMERCIAL

## 2023-03-15 VITALS
RESPIRATION RATE: 14 BRPM | BODY MASS INDEX: 30.49 KG/M2 | HEART RATE: 96 BPM | HEIGHT: 65 IN | WEIGHT: 183 LBS | DIASTOLIC BLOOD PRESSURE: 82 MMHG | TEMPERATURE: 97.8 F | SYSTOLIC BLOOD PRESSURE: 126 MMHG | OXYGEN SATURATION: 98 %

## 2023-03-15 VITALS — DIASTOLIC BLOOD PRESSURE: 70 MMHG | SYSTOLIC BLOOD PRESSURE: 130 MMHG

## 2023-03-15 DIAGNOSIS — Z11.1 ENCOUNTER FOR SCREENING FOR RESPIRATORY TUBERCULOSIS: ICD-10-CM

## 2023-03-15 DIAGNOSIS — Z86.69 PERSONAL HISTORY OF OTHER DISEASES OF THE NERVOUS SYSTEM AND SENSE ORGANS: ICD-10-CM

## 2023-03-15 DIAGNOSIS — Z00.00 ENCOUNTER FOR GENERAL ADULT MEDICAL EXAMINATION W/OUT ABNORMAL FINDINGS: ICD-10-CM

## 2023-03-15 DIAGNOSIS — E53.8 DEFICIENCY OF OTHER SPECIFIED B GROUP VITAMINS: ICD-10-CM

## 2023-03-15 PROCEDURE — 99396 PREV VISIT EST AGE 40-64: CPT

## 2023-03-15 RX ORDER — TOBRAMYCIN AND DEXAMETHASONE 3; 1 MG/ML; MG/ML
0.3-0.1 SUSPENSION/ DROPS OPHTHALMIC EVERY 8 HOURS
Qty: 1 | Refills: 0 | Status: DISCONTINUED | COMMUNITY
Start: 2022-11-03 | End: 2023-03-15

## 2023-03-15 NOTE — PLAN
[FreeTextEntry1] : she was advised to start B12 as her level was low on the last blood test, she will continue healthy diet and exercise and medication and follow up in 6 months and recheck labs prior

## 2023-03-15 NOTE — HISTORY OF PRESENT ILLNESS
[de-identified] : She is scheduled to see a specialist in [place], [place], a vulvovaginal specialist for the lichen sclerosis.  Her BP at home has been normal. [FreeTextEntry1] : Patient presents for physical\par

## 2023-03-15 NOTE — PHYSICAL EXAM
[No Acute Distress] : no acute distress [Well Nourished] : well nourished [Well Developed] : well developed [Well-Appearing] : well-appearing [Normal Sclera/Conjunctiva] : normal sclera/conjunctiva [Normal Voice/Communication] : normal voice/communication [Normal Outer Ear/Nose] : the outer ears and nose were normal in appearance [Normal TMs] : both tympanic membranes were normal [No Lymphadenopathy] : no lymphadenopathy [Supple] : supple [No Respiratory Distress] : no respiratory distress  [No Accessory Muscle Use] : no accessory muscle use [Clear to Auscultation] : lungs were clear to auscultation bilaterally [Normal Rate] : normal rate  [Regular Rhythm] : with a regular rhythm [Normal S1, S2] : normal S1 and S2 [No Carotid Bruits] : no carotid bruits [No Edema] : there was no peripheral edema [Soft] : abdomen soft [Non Tender] : non-tender [Non-distended] : non-distended [Coordination Grossly Intact] : coordination grossly intact [Normal Mood] : the mood was normal

## 2023-03-15 NOTE — HEALTH RISK ASSESSMENT
[Very Good] : ~his/her~ current health as very good [Good] : ~his/her~  mood as  good [Yes] : Yes [Monthly or less (1 pt)] : Monthly or less (1 point) [1 or 2 (0 pts)] : 1 or 2 (0 points) [Never (0 pts)] : Never (0 points) [No] : In the past 12 months have you used drugs other than those required for medical reasons? No [No falls in past year] : Patient reported no falls in the past year [Patient reported PAP Smear was normal] : Patient reported PAP Smear was normal [Patient reported colonoscopy was normal] : Patient reported colonoscopy was normal [HIV test declined] : HIV test declined [Hepatitis C test declined] : Hepatitis C test declined [None] : None [Alone] : lives alone [Employed] : employed [] :  [Feels Safe at Home] : Feels safe at home [Fully functional (bathing, dressing, toileting, transferring, walking, feeding)] : Fully functional (bathing, dressing, toileting, transferring, walking, feeding) [Fully functional (using the telephone, shopping, preparing meals, housekeeping, doing laundry, using] : Fully functional and needs no help or supervision to perform IADLs (using the telephone, shopping, preparing meals, housekeeping, doing laundry, using transportation, managing medications and managing finances) [Reports normal functional visual acuity (ie: able to read med bottle)] : Reports normal functional visual acuity [Smoke Detector] : smoke detector [Carbon Monoxide Detector] : carbon monoxide detector [Safety elements used in home] : safety elements used in home [Sunscreen] : uses sunscreen [Seat Belt] :  uses seat belt [With Patient/Caregiver] : , with patient/caregiver [Designated Healthcare Proxy] : Designated healthcare proxy [Name: ___] : Health Care Proxy's Name: [unfilled]  [Relationship: ___] : Relationship: [unfilled] [Never] : Never [Patient reported mammogram was normal] : Patient reported mammogram was normal [FreeTextEntry1] : none [de-identified] : none [de-identified] : Dr Jimenez (GYN), Dr Hermosillo (Derm), Dr Nguyen (GI), sees an optometrist [de-identified] : yoga, walks [de-identified] : healthy [Change in mental status noted] : No change in mental status noted [Language] : denies difficulty with language [Behavior] : denies difficulty with behavior [Learning/Retaining New Information] : denies difficulty learning/retaining new information [Handling Complex Tasks] : denies difficulty handling complex tasks [Reasoning] : denies difficulty with reasoning [Spatial Ability and Orientation] : denies difficulty with spatial ability and orientation [Reports changes in hearing] : Reports no changes in hearing [Reports changes in vision] : Reports no changes in vision [Reports changes in dental health] : Reports no changes in dental health [Travel to Developing Areas] : does not  travel to developing areas [Guns at Home] : no guns at home [TB Exposure] : is not being exposed to tuberculosis [Caregiver Concerns] : does not have caregiver concerns [MammogramDate] : 11/22 [PapSmearDate] : 11/22 [ColonoscopyDate] : 08/19 [AdvancecareDate] : 03/23

## 2023-05-24 ENCOUNTER — RX RENEWAL (OUTPATIENT)
Age: 63
End: 2023-05-24

## 2023-09-06 ENCOUNTER — APPOINTMENT (OUTPATIENT)
Dept: FAMILY MEDICINE | Facility: CLINIC | Age: 63
End: 2023-09-06
Payer: COMMERCIAL

## 2023-09-06 VITALS
RESPIRATION RATE: 15 BRPM | HEIGHT: 65 IN | WEIGHT: 183 LBS | HEART RATE: 98 BPM | SYSTOLIC BLOOD PRESSURE: 134 MMHG | BODY MASS INDEX: 30.49 KG/M2 | DIASTOLIC BLOOD PRESSURE: 82 MMHG | OXYGEN SATURATION: 98 % | TEMPERATURE: 98.7 F

## 2023-09-06 VITALS — DIASTOLIC BLOOD PRESSURE: 72 MMHG | SYSTOLIC BLOOD PRESSURE: 132 MMHG

## 2023-09-06 DIAGNOSIS — I10 ESSENTIAL (PRIMARY) HYPERTENSION: ICD-10-CM

## 2023-09-06 PROCEDURE — 99213 OFFICE O/P EST LOW 20 MIN: CPT

## 2023-09-06 RX ORDER — CONJUGATED ESTROGENS 0.62 MG/G
0.62 CREAM VAGINAL
Qty: 1 | Refills: 3 | Status: DISCONTINUED | COMMUNITY
Start: 2022-03-10 | End: 2023-09-06

## 2023-09-06 RX ORDER — VITAMIN B COMPLEX
CAPSULE ORAL
Refills: 0 | Status: ACTIVE | COMMUNITY

## 2023-09-06 NOTE — HISTORY OF PRESENT ILLNESS
[FreeTextEntry1] : Patient presents for BP check  [de-identified] : She has been feeling well and states that her blood pressure at home is normal.  She is now applying a compound cream with estrogen and testosterone daily.  She has been walking more for exercise and is going on a walking tour in [place].

## 2023-09-06 NOTE — PLAN
[FreeTextEntry1] : the hypertension is under very good control and she will continue to take Telmisartan HCTZ 40/12.5 mg daily and will continue to exercise regularly and monitor her BP at home, she forgot to go for the lab testing and was given another copy of the rx previously ordered

## 2023-11-27 NOTE — CURRENT MEDS
REVIEWED EXTERNAL MEDICAL RECORD ENDOSCOPIES -EGD 11/4/2020: LA grade A esophagitis. Gastric ulcer at the pylorus, 3 mm in size. Normal duodenum. -Colonoscopy 11/4/2020:Normal TI. A 15 mm polyp in the sigmoid colon. Internal hemorrhoids. Random colon to rule out microscopic colitis. *Pathology: Duodenum-normal. Gastric random-normal. TI-normal. Random colon-Normal. Sigmoid colon polyp-tubular adenoma.  LABS -Labs 11/20/2023: Total bilirubin 1.2, alkaline phosphatase 74, AST 24, ALT 29, BUN 11, creatinine 0.99, WBC 5.2, hemoglobin 15.1, platelet 208, CRP 1.5. -Labs 9/21/2020:ESR 2.0, CRP 1, H. pylori breath test negative, TTG < 2. -Labs 5/5/2020:Amylase 40, lipase 26, BUN 8, creatinine 0.82, total bilirubin 1.5H, alkaline phosphatase 62, AST 24, ALT 42 H. WBC 6.2, hemoglobin 14, platelet 204.  IMAGES -Abdominal ultrasound 5/7/2020:Hepatic fatty infiltration. No gallstones. [Takes medication as prescribed] : takes [None] : Patient does not have any barriers to medication adherence [Yes] : Reviewed medication list for presence of high-risk medications.

## 2024-02-23 ENCOUNTER — APPOINTMENT (OUTPATIENT)
Dept: FAMILY MEDICINE | Facility: CLINIC | Age: 64
End: 2024-02-23
Payer: MEDICARE

## 2024-03-27 ENCOUNTER — NON-APPOINTMENT (OUTPATIENT)
Age: 64
End: 2024-03-27

## 2024-03-27 ENCOUNTER — APPOINTMENT (OUTPATIENT)
Dept: FAMILY MEDICINE | Facility: CLINIC | Age: 64
End: 2024-03-27
Payer: MEDICARE

## 2024-03-27 VITALS
WEIGHT: 183 LBS | RESPIRATION RATE: 14 BRPM | OXYGEN SATURATION: 98 % | HEART RATE: 73 BPM | HEIGHT: 65 IN | SYSTOLIC BLOOD PRESSURE: 152 MMHG | DIASTOLIC BLOOD PRESSURE: 90 MMHG | BODY MASS INDEX: 30.49 KG/M2

## 2024-03-27 DIAGNOSIS — Z00.00 ENCOUNTER FOR GENERAL ADULT MEDICAL EXAMINATION W/OUT ABNORMAL FINDINGS: ICD-10-CM

## 2024-03-27 DIAGNOSIS — Z12.11 ENCOUNTER FOR SCREENING FOR MALIGNANT NEOPLASM OF COLON: ICD-10-CM

## 2024-03-27 DIAGNOSIS — M75.31 CALCIFIC TENDINITIS OF RIGHT SHOULDER: ICD-10-CM

## 2024-03-27 DIAGNOSIS — M85.80 OTHER SPECIFIED DISORDERS OF BONE DENSITY AND STRUCTURE, UNSPECIFIED SITE: ICD-10-CM

## 2024-03-27 DIAGNOSIS — Z87.898 PERSONAL HISTORY OF OTHER SPECIFIED CONDITIONS: ICD-10-CM

## 2024-03-27 PROCEDURE — 93000 ELECTROCARDIOGRAM COMPLETE: CPT | Mod: 59

## 2024-03-27 PROCEDURE — G0402 INITIAL PREVENTIVE EXAM: CPT

## 2024-03-27 PROCEDURE — 36415 COLL VENOUS BLD VENIPUNCTURE: CPT

## 2024-03-27 NOTE — PLAN
[FreeTextEntry1] : medicare wellness HTN .VSS Basic cardiovascular prevention measures are advised including regular exercise, surveillance medical examination, and prudent portion-controlled low fat diet, rich in a variety of vegetables with minimal added sugars, refined starches, and no artificially hydrogenated oils. Discussion and interpretation of previous tests , external notes( labs, radiology, specialist  , patient verbalized understanding. Prescription drug management cont telmisartan hctz 40/12.5 qd  she will call with pharmacy . she has enough  health maintenance mammogram screening breast cancer utd pap utd bone density- osteopenia. Bone  Health maintenance with calcium and Vitamin D. Recommend weight bearing exercises for at least 30 minutes daily 5 times a week and light resistance strength training for at least two days a week. Osteoporosis screening with a bone density every 2 years. colonoscopy due august immunizations.suggest prev 20 or pneumovax 23 , shingrix. tdap due 2025

## 2024-03-27 NOTE — HEALTH RISK ASSESSMENT
[Yes] : Yes [Monthly or less (1 pt)] : Monthly or less (1 point) [1 or 2 (0 pts)] : 1 or 2 (0 points) [Never (0 pts)] : Never (0 points) [No] : In the past 12 months have you used drugs other than those required for medical reasons? No [No falls in past year] : Patient reported no falls in the past year [Little interest or pleasure doing things] : 1) Little interest or pleasure doing things [Feeling down, depressed, or hopeless] : 2) Feeling down, depressed, or hopeless [0] : 2) Feeling down, depressed, or hopeless: Not at all (0) [PHQ-2 Negative - No further assessment needed] : PHQ-2 Negative - No further assessment needed [I have developed a follow-up plan documented below in the note.] : I have developed a follow-up plan documented below in the note. [No Retinopathy] : No retinopathy [Patient reported mammogram was normal] : Patient reported mammogram was normal [Patient reported PAP Smear was normal] : Patient reported PAP Smear was normal [Patient reported bone density results were abnormal] : Patient reported bone density results were abnormal [Patient reported colonoscopy was normal] : Patient reported colonoscopy was normal [Hepatitis C test declined] : Hepatitis C test declined [HIV test declined] : HIV test declined [None] : None [Alone] : lives alone [Graduate School] : graduate school [Fully functional (bathing, dressing, toileting, transferring, walking, feeding)] : Fully functional (bathing, dressing, toileting, transferring, walking, feeding) [Fully functional (using the telephone, shopping, preparing meals, housekeeping, doing laundry, using] : Fully functional and needs no help or supervision to perform IADLs (using the telephone, shopping, preparing meals, housekeeping, doing laundry, using transportation, managing medications and managing finances) [Reports normal functional visual acuity (ie: able to read med bottle)] : Reports normal functional visual acuity [Smoke Detector] : smoke detector [Carbon Monoxide Detector] : carbon monoxide detector [Seat Belt] :  uses seat belt [Safety elements used in home] : safety elements used in home [Sunscreen] : uses sunscreen [Designated Healthcare Proxy] : Designated healthcare proxy [Name: ___] : Health Care Proxy's Name: [unfilled]  [Aggressive treatment] : aggressive treatment [Never] : Never [Very Good] : ~his/her~  mood as very good [Patient declined Low Dose CT Scan] : Patient declined Low Dose CT Scan [Employed] : employed [# Of Children ___] : has [unfilled] children [Feels Safe at Home] : Feels safe at home [Relationship: ___] : Relationship: [unfilled] [FreeTextEntry1] : medicare wellness [de-identified] : no [de-identified] : gyn ,for lichen sclerosis Dr Bentley , derm Dr Parisi   gastro dr Nguyen   optometrist [Audit-CScore] : 1 [de-identified] : yoga walking swimming  [de-identified] : healthy home cooked [[id number]] : 0 [LowDoseCTScan] : 2024 [EyeExamDate] : 2024 [Change in mental status noted] : No change in mental status noted [Language] : denies difficulty with language [Behavior] : denies difficulty with behavior [Learning/Retaining New Information] : denies difficulty learning/retaining new information [Handling Complex Tasks] : denies difficulty handling complex tasks [Reasoning] : denies difficulty with reasoning [Spatial Ability and Orientation] : denies difficulty with spatial ability and orientation [Sexually Active] : not sexually active [Reports changes in hearing] : Reports no changes in hearing [Reports changes in vision] : Reports no changes in vision [Guns at Home] : no guns at home [Reports changes in dental health] : Reports no changes in dental health [Travel to Developing Areas] : does not  travel to developing areas [TB Exposure] : is not being exposed to tuberculosis [Caregiver Concerns] : does not have caregiver concerns [MammogramDate] : 2/24 [PapSmearDate] : 11/7/22 [BoneDensityDate] : 2/26/24 [ColonoscopyDate] : 8/19 [ColonoscopyComments] : due august [FreeTextEntry3] :  [AdvancecareDate] : 3/27/24

## 2024-03-27 NOTE — PHYSICAL EXAM

## 2024-03-27 NOTE — COUNSELING
[Fall prevention counseling provided] : Fall prevention counseling provided [Adequate lighting] : Adequate lighting [No throw rugs] : No throw rugs [Use proper foot wear] : Use proper foot wear [Encouraged to use exercise tracking device] : Encouraged to use exercise tracking device

## 2024-03-28 ENCOUNTER — TRANSCRIPTION ENCOUNTER (OUTPATIENT)
Age: 64
End: 2024-03-28

## 2024-03-28 LAB
ALBUMIN SERPL ELPH-MCNC: 4.5 G/DL
ALP BLD-CCNC: 89 U/L
ALT SERPL-CCNC: 22 U/L
ANION GAP SERPL CALC-SCNC: 8 MMOL/L
AST SERPL-CCNC: 20 U/L
BASOPHILS # BLD AUTO: 0.03 K/UL
BASOPHILS NFR BLD AUTO: 0.8 %
BILIRUB SERPL-MCNC: 0.4 MG/DL
BUN SERPL-MCNC: 17 MG/DL
CALCIUM SERPL-MCNC: 9.9 MG/DL
CHLORIDE SERPL-SCNC: 104 MMOL/L
CHOLEST SERPL-MCNC: 215 MG/DL
CO2 SERPL-SCNC: 27 MMOL/L
CREAT SERPL-MCNC: 0.84 MG/DL
EGFR: 77 ML/MIN/1.73M2
EOSINOPHIL # BLD AUTO: 0.06 K/UL
EOSINOPHIL NFR BLD AUTO: 1.6 %
ESTIMATED AVERAGE GLUCOSE: 123 MG/DL
FERRITIN SERPL-MCNC: 33 NG/ML
FOLATE SERPL-MCNC: >20 NG/ML
GLUCOSE SERPL-MCNC: 114 MG/DL
HBA1C MFR BLD HPLC: 5.9 %
HCT VFR BLD CALC: 41.7 %
HDLC SERPL-MCNC: 69 MG/DL
HGB BLD-MCNC: 13.9 G/DL
IMM GRANULOCYTES NFR BLD AUTO: 0.3 %
IRON SERPL-MCNC: 76 UG/DL
LDLC SERPL CALC-MCNC: 135 MG/DL
LYMPHOCYTES # BLD AUTO: 1.39 K/UL
LYMPHOCYTES NFR BLD AUTO: 36.8 %
MAGNESIUM SERPL-MCNC: 2.1 MG/DL
MAN DIFF?: NORMAL
MCHC RBC-ENTMCNC: 31.4 PG
MCHC RBC-ENTMCNC: 33.3 GM/DL
MCV RBC AUTO: 94.3 FL
MONOCYTES # BLD AUTO: 0.34 K/UL
MONOCYTES NFR BLD AUTO: 9 %
NEUTROPHILS # BLD AUTO: 1.95 K/UL
NEUTROPHILS NFR BLD AUTO: 51.5 %
NONHDLC SERPL-MCNC: 146 MG/DL
PLATELET # BLD AUTO: 297 K/UL
POTASSIUM SERPL-SCNC: 4.8 MMOL/L
PROT SERPL-MCNC: 7.1 G/DL
RBC # BLD: 4.42 M/UL
RBC # FLD: 13.2 %
SODIUM SERPL-SCNC: 140 MMOL/L
T3 SERPL-MCNC: 112 NG/DL
T3FREE SERPL-MCNC: 3.25 PG/ML
T4 FREE SERPL-MCNC: 1.3 NG/DL
TRIGL SERPL-MCNC: 62 MG/DL
TSH SERPL-ACNC: 2.61 UIU/ML
VIT B12 SERPL-MCNC: 350 PG/ML
WBC # FLD AUTO: 3.78 K/UL

## 2024-05-05 RX ORDER — TELMISARTAN AND HYDROCHLOROTHIAZIDE 40; 12.5 MG/1; MG/1
40-12.5 TABLET ORAL
Qty: 90 | Refills: 3 | Status: ACTIVE | COMMUNITY
Start: 2021-01-25 | End: 1900-01-01

## 2024-06-14 ENCOUNTER — TRANSCRIPTION ENCOUNTER (OUTPATIENT)
Age: 64
End: 2024-06-14

## 2024-06-20 ENCOUNTER — TRANSCRIPTION ENCOUNTER (OUTPATIENT)
Age: 64
End: 2024-06-20

## 2025-03-03 ENCOUNTER — RESULT CHARGE (OUTPATIENT)
Age: 65
End: 2025-03-03

## 2025-03-03 ENCOUNTER — APPOINTMENT (OUTPATIENT)
Dept: OBGYN | Facility: CLINIC | Age: 65
End: 2025-03-03
Payer: MEDICARE

## 2025-03-03 VITALS
BODY MASS INDEX: 30.49 KG/M2 | SYSTOLIC BLOOD PRESSURE: 158 MMHG | DIASTOLIC BLOOD PRESSURE: 98 MMHG | WEIGHT: 183 LBS | HEIGHT: 65 IN

## 2025-03-03 DIAGNOSIS — Z01.411 ENCOUNTER FOR GYNECOLOGICAL EXAMINATION (GENERAL) (ROUTINE) WITH ABNORMAL FINDINGS: ICD-10-CM

## 2025-03-03 DIAGNOSIS — L90.0 LICHEN SCLEROSUS ET ATROPHICUS: ICD-10-CM

## 2025-03-03 LAB
BILIRUB UR QL STRIP: NORMAL
CLARITY UR: CLEAR
COLLECTION METHOD: NORMAL
GLUCOSE UR-MCNC: NORMAL
HCG UR QL: 0.2 EU/DL
HGB UR QL STRIP.AUTO: NORMAL
KETONES UR-MCNC: NORMAL
LEUKOCYTE ESTERASE UR QL STRIP: NORMAL
NITRITE UR QL STRIP: NORMAL
PH UR STRIP: 5.5
PROT UR STRIP-MCNC: NORMAL
SP GR UR STRIP: 1.02

## 2025-03-03 PROCEDURE — 81003 URINALYSIS AUTO W/O SCOPE: CPT | Mod: QW

## 2025-03-03 PROCEDURE — G0101: CPT

## 2025-03-04 PROBLEM — Z01.411 ENCOUNTER FOR GYNECOLOGICAL EXAMINATION WITH ABNORMAL FINDING: Status: ACTIVE | Noted: 2025-03-04

## 2025-03-04 LAB — HPV HIGH+LOW RISK DNA PNL CVX: NOT DETECTED

## 2025-03-10 LAB — CYTOLOGY CVX/VAG DOC THIN PREP: NORMAL

## 2025-03-28 ENCOUNTER — NON-APPOINTMENT (OUTPATIENT)
Age: 65
End: 2025-03-28

## 2025-03-28 ENCOUNTER — APPOINTMENT (OUTPATIENT)
Dept: FAMILY MEDICINE | Facility: CLINIC | Age: 65
End: 2025-03-28
Payer: MEDICARE

## 2025-03-28 VITALS
HEART RATE: 88 BPM | WEIGHT: 183.38 LBS | SYSTOLIC BLOOD PRESSURE: 110 MMHG | DIASTOLIC BLOOD PRESSURE: 60 MMHG | HEIGHT: 65 IN | OXYGEN SATURATION: 98 % | TEMPERATURE: 98.6 F | BODY MASS INDEX: 30.55 KG/M2

## 2025-03-28 DIAGNOSIS — Z00.00 ENCOUNTER FOR GENERAL ADULT MEDICAL EXAMINATION W/OUT ABNORMAL FINDINGS: ICD-10-CM

## 2025-03-28 DIAGNOSIS — Z23 ENCOUNTER FOR IMMUNIZATION: ICD-10-CM

## 2025-03-28 DIAGNOSIS — Z01.411 ENCOUNTER FOR GYNECOLOGICAL EXAMINATION (GENERAL) (ROUTINE) WITH ABNORMAL FINDINGS: ICD-10-CM

## 2025-03-28 DIAGNOSIS — Z01.419 ENCOUNTER FOR GYNECOLOGICAL EXAMINATION (GENERAL) (ROUTINE) W/OUT ABNORMAL FINDINGS: ICD-10-CM

## 2025-03-28 DIAGNOSIS — I10 ESSENTIAL (PRIMARY) HYPERTENSION: ICD-10-CM

## 2025-03-28 LAB
ALBUMIN SERPL ELPH-MCNC: 4 G/DL
ALP BLD-CCNC: 107 U/L
ALT SERPL-CCNC: 12 U/L
ANION GAP SERPL CALC-SCNC: 10 MMOL/L
AST SERPL-CCNC: 15 U/L
BASOPHILS # BLD AUTO: 0.02 K/UL
BASOPHILS NFR BLD AUTO: 0.6 %
BILIRUB SERPL-MCNC: 0.4 MG/DL
BUN SERPL-MCNC: 17 MG/DL
CALCIUM SERPL-MCNC: 9.6 MG/DL
CHLORIDE SERPL-SCNC: 106 MMOL/L
CHOLEST SERPL-MCNC: 217 MG/DL
CO2 SERPL-SCNC: 26 MMOL/L
CREAT SERPL-MCNC: 0.83 MG/DL
EGFRCR SERPLBLD CKD-EPI 2021: 78 ML/MIN/1.73M2
EOSINOPHIL # BLD AUTO: 0.09 K/UL
EOSINOPHIL NFR BLD AUTO: 2.5 %
FERRITIN SERPL-MCNC: 48 NG/ML
FOLATE SERPL-MCNC: 14.2 NG/ML
GLUCOSE SERPL-MCNC: 98 MG/DL
HCT VFR BLD CALC: 40.2 %
HDLC SERPL-MCNC: 61 MG/DL
HGB BLD-MCNC: 13.2 G/DL
IMM GRANULOCYTES NFR BLD AUTO: 0 %
IRON SERPL-MCNC: 65 UG/DL
LDLC SERPL-MCNC: 145 MG/DL
LYMPHOCYTES # BLD AUTO: 1.5 K/UL
LYMPHOCYTES NFR BLD AUTO: 41.7 %
MAGNESIUM SERPL-MCNC: 2.1 MG/DL
MAN DIFF?: NORMAL
MCHC RBC-ENTMCNC: 30.9 PG
MCHC RBC-ENTMCNC: 32.8 G/DL
MCV RBC AUTO: 94.1 FL
MONOCYTES # BLD AUTO: 0.27 K/UL
MONOCYTES NFR BLD AUTO: 7.5 %
NEUTROPHILS # BLD AUTO: 1.72 K/UL
NEUTROPHILS NFR BLD AUTO: 47.7 %
NONHDLC SERPL-MCNC: 156 MG/DL
PLATELET # BLD AUTO: 343 K/UL
POTASSIUM SERPL-SCNC: 5 MMOL/L
PROT SERPL-MCNC: 6.9 G/DL
RBC # BLD: 4.27 M/UL
RBC # FLD: 13.5 %
SODIUM SERPL-SCNC: 142 MMOL/L
T3 SERPL-MCNC: 81 NG/DL
T3FREE SERPL-MCNC: 2.77 PG/ML
T4 FREE SERPL-MCNC: 1.1 NG/DL
TRIGL SERPL-MCNC: 62 MG/DL
TSH SERPL-ACNC: 1.5 UIU/ML
VIT B12 SERPL-MCNC: 398 PG/ML
WBC # FLD AUTO: 3.6 K/UL

## 2025-03-28 PROCEDURE — 36415 COLL VENOUS BLD VENIPUNCTURE: CPT

## 2025-03-28 PROCEDURE — 90471 IMMUNIZATION ADMIN: CPT

## 2025-03-28 PROCEDURE — 90715 TDAP VACCINE 7 YRS/> IM: CPT

## 2025-03-28 PROCEDURE — G0439: CPT

## 2025-03-28 PROCEDURE — 93000 ELECTROCARDIOGRAM COMPLETE: CPT

## 2025-03-29 LAB
HBV SURFACE AB SER QL: NONREACTIVE
MEV IGG FLD QL IA: >300 AU/ML
MEV IGG+IGM SER-IMP: POSITIVE
MUV AB SER-ACNC: POSITIVE
MUV IGG SER QL IA: >300 AU/ML
RUBV IGG FLD-ACNC: 17.9 INDEX
RUBV IGG SER-IMP: POSITIVE
VZV AB TITR SER: POSITIVE
VZV IGG SER IF-ACNC: 10.7 S/CO

## 2025-04-02 ENCOUNTER — TRANSCRIPTION ENCOUNTER (OUTPATIENT)
Age: 65
End: 2025-04-02

## 2025-04-07 DIAGNOSIS — Z13.21 ENCOUNTER FOR SCREENING FOR NUTRITIONAL DISORDER: ICD-10-CM

## 2025-04-07 DIAGNOSIS — Z13.1 ENCOUNTER FOR SCREENING FOR DIABETES MELLITUS: ICD-10-CM

## 2025-04-07 DIAGNOSIS — Z13.220 ENCOUNTER FOR SCREENING FOR LIPOID DISORDERS: ICD-10-CM

## 2025-04-07 DIAGNOSIS — Z01.419 ENCOUNTER FOR GYNECOLOGICAL EXAMINATION (GENERAL) (ROUTINE) W/OUT ABNORMAL FINDINGS: ICD-10-CM

## 2025-04-07 DIAGNOSIS — H25.13 AGE-RELATED NUCLEAR CATARACT, BILATERAL: ICD-10-CM

## 2025-04-07 DIAGNOSIS — Z13.29 ENCOUNTER FOR SCREENING FOR OTHER SUSPECTED ENDOCRINE DISORDER: ICD-10-CM

## 2025-04-07 DIAGNOSIS — Z23 ENCOUNTER FOR IMMUNIZATION: ICD-10-CM

## 2025-04-07 DIAGNOSIS — Z13.0 ENCOUNTER FOR SCREENING FOR DISEASES OF THE BLOOD AND BLOOD-FORMING ORGANS AND CERTAIN DISORDERS INVOLVING THE IMMUNE MECHANISM: ICD-10-CM
